# Patient Record
Sex: MALE | Race: WHITE | NOT HISPANIC OR LATINO | URBAN - METROPOLITAN AREA
[De-identification: names, ages, dates, MRNs, and addresses within clinical notes are randomized per-mention and may not be internally consistent; named-entity substitution may affect disease eponyms.]

---

## 2017-02-18 ENCOUNTER — OUTPATIENT (OUTPATIENT)
Dept: OUTPATIENT SERVICES | Facility: HOSPITAL | Age: 67
LOS: 1 days | Discharge: HOME | End: 2017-02-18

## 2017-06-27 DIAGNOSIS — N32.9 BLADDER DISORDER, UNSPECIFIED: ICD-10-CM

## 2017-06-27 DIAGNOSIS — R10.9 UNSPECIFIED ABDOMINAL PAIN: ICD-10-CM

## 2017-11-29 ENCOUNTER — OUTPATIENT (OUTPATIENT)
Dept: OUTPATIENT SERVICES | Facility: HOSPITAL | Age: 67
LOS: 1 days | Discharge: HOME | End: 2017-11-29

## 2017-11-29 DIAGNOSIS — R73.9 HYPERGLYCEMIA, UNSPECIFIED: ICD-10-CM

## 2017-11-29 DIAGNOSIS — E55.9 VITAMIN D DEFICIENCY, UNSPECIFIED: ICD-10-CM

## 2017-11-29 DIAGNOSIS — E31.9 POLYGLANDULAR DYSFUNCTION, UNSPECIFIED: ICD-10-CM

## 2017-12-05 DIAGNOSIS — R82.90 UNSPECIFIED ABNORMAL FINDINGS IN URINE: ICD-10-CM

## 2017-12-05 DIAGNOSIS — E07.9 DISORDER OF THYROID, UNSPECIFIED: ICD-10-CM

## 2017-12-05 DIAGNOSIS — K75.2 NONSPECIFIC REACTIVE HEPATITIS: ICD-10-CM

## 2017-12-05 DIAGNOSIS — D50.9 IRON DEFICIENCY ANEMIA, UNSPECIFIED: ICD-10-CM

## 2017-12-05 DIAGNOSIS — E61.1 IRON DEFICIENCY: ICD-10-CM

## 2017-12-05 DIAGNOSIS — N42.9 DISORDER OF PROSTATE, UNSPECIFIED: ICD-10-CM

## 2017-12-05 DIAGNOSIS — Z79.899 OTHER LONG TERM (CURRENT) DRUG THERAPY: ICD-10-CM

## 2018-09-14 ENCOUNTER — OUTPATIENT (OUTPATIENT)
Dept: OUTPATIENT SERVICES | Facility: HOSPITAL | Age: 68
LOS: 1 days | Discharge: HOME | End: 2018-09-14

## 2018-09-14 DIAGNOSIS — Z00.01 ENCOUNTER FOR GENERAL ADULT MEDICAL EXAMINATION WITH ABNORMAL FINDINGS: ICD-10-CM

## 2020-02-14 ENCOUNTER — OUTPATIENT (OUTPATIENT)
Dept: OUTPATIENT SERVICES | Facility: HOSPITAL | Age: 70
LOS: 1 days | Discharge: HOME | End: 2020-02-14
Payer: MEDICARE

## 2020-02-14 DIAGNOSIS — M54.5 LOW BACK PAIN: ICD-10-CM

## 2020-02-14 DIAGNOSIS — M25.552 PAIN IN LEFT HIP: ICD-10-CM

## 2020-02-14 DIAGNOSIS — R10.9 UNSPECIFIED ABDOMINAL PAIN: ICD-10-CM

## 2020-02-14 DIAGNOSIS — R05 COUGH: ICD-10-CM

## 2020-02-14 DIAGNOSIS — M25.551 PAIN IN RIGHT HIP: ICD-10-CM

## 2020-02-14 PROCEDURE — 72110 X-RAY EXAM L-2 SPINE 4/>VWS: CPT | Mod: 26

## 2020-02-14 PROCEDURE — 71046 X-RAY EXAM CHEST 2 VIEWS: CPT | Mod: 26

## 2020-02-14 PROCEDURE — 76775 US EXAM ABDO BACK WALL LIM: CPT | Mod: 26

## 2020-02-14 PROCEDURE — 73522 X-RAY EXAM HIPS BI 3-4 VIEWS: CPT | Mod: 26

## 2020-02-26 ENCOUNTER — APPOINTMENT (OUTPATIENT)
Dept: NEUROSURGERY | Facility: CLINIC | Age: 70
End: 2020-02-26
Payer: MEDICARE

## 2020-02-26 VITALS — HEIGHT: 71 IN | WEIGHT: 225 LBS | BODY MASS INDEX: 31.5 KG/M2

## 2020-02-26 DIAGNOSIS — Z82.5 FAMILY HISTORY OF ASTHMA AND OTHER CHRONIC LOWER RESPIRATORY DISEASES: ICD-10-CM

## 2020-02-26 DIAGNOSIS — Z87.01 PERSONAL HISTORY OF PNEUMONIA (RECURRENT): ICD-10-CM

## 2020-02-26 DIAGNOSIS — Z72.89 OTHER PROBLEMS RELATED TO LIFESTYLE: ICD-10-CM

## 2020-02-26 DIAGNOSIS — F17.210 NICOTINE DEPENDENCE, CIGARETTES, UNCOMPLICATED: ICD-10-CM

## 2020-02-26 PROBLEM — Z00.00 ENCOUNTER FOR PREVENTIVE HEALTH EXAMINATION: Status: ACTIVE | Noted: 2020-02-26

## 2020-02-26 PROCEDURE — 99203 OFFICE O/P NEW LOW 30 MIN: CPT

## 2020-02-26 RX ORDER — ASPIRIN 81 MG/1
81 TABLET, CHEWABLE ORAL
Refills: 0 | Status: ACTIVE | COMMUNITY

## 2020-02-26 NOTE — HISTORY OF PRESENT ILLNESS
[> 3 months] : more  than 3 months [FreeTextEntry1] : low back pain radiating to both legs, posteriorly and neck pain radiating to right arm [de-identified] : This is a 69yrs old male who presents today for a consultation of mid low back pain radiating to both legs posteriorly, down to the knee, worse on the right side for the past year. It is associated with numbness, tingling, and weakness. He reports he has been losing his balance for a year. Denies bowel and/or urinary incontinence. Patient has not tried physical therapy nor has he been recently evaluated by pain management. He had an INDRA four or five years ago which he reports did not alleviate his symptoms. Symptoms are aggravated by prolong driving and sitting, and it is alleviated by resting. \par Mr. Kwok also reports of neck pain radiating to the right arm to the hand for approximately 2 to 3 years. It is associated with paresthesia. Denies weakness. He has no recent MRI imaging.

## 2020-02-26 NOTE — REASON FOR VISIT
[New Patient Visit] : a new patient visit [Referred By: _________] : Patient was referred by SEN [Other: _____] : [unfilled]

## 2020-03-17 ENCOUNTER — RESULT REVIEW (OUTPATIENT)
Age: 70
End: 2020-03-17

## 2020-03-17 ENCOUNTER — OUTPATIENT (OUTPATIENT)
Dept: OUTPATIENT SERVICES | Facility: HOSPITAL | Age: 70
LOS: 1 days | Discharge: HOME | End: 2020-03-17
Payer: MEDICARE

## 2020-03-17 DIAGNOSIS — M54.12 RADICULOPATHY, CERVICAL REGION: ICD-10-CM

## 2020-03-17 PROCEDURE — 72148 MRI LUMBAR SPINE W/O DYE: CPT | Mod: 26

## 2020-03-17 PROCEDURE — 72141 MRI NECK SPINE W/O DYE: CPT | Mod: 26

## 2020-03-18 ENCOUNTER — OUTPATIENT (OUTPATIENT)
Dept: OUTPATIENT SERVICES | Facility: HOSPITAL | Age: 70
LOS: 1 days | Discharge: HOME | End: 2020-03-18
Payer: MEDICARE

## 2020-03-18 DIAGNOSIS — E80.7 DISORDER OF BILIRUBIN METABOLISM, UNSPECIFIED: ICD-10-CM

## 2020-03-18 PROCEDURE — 76700 US EXAM ABDOM COMPLETE: CPT | Mod: 26

## 2020-07-08 ENCOUNTER — APPOINTMENT (OUTPATIENT)
Dept: NEUROSURGERY | Facility: CLINIC | Age: 70
End: 2020-07-08

## 2020-07-20 ENCOUNTER — APPOINTMENT (OUTPATIENT)
Dept: NEUROSURGERY | Facility: CLINIC | Age: 70
End: 2020-07-20
Payer: MEDICARE

## 2020-07-20 VITALS — WEIGHT: 225 LBS | BODY MASS INDEX: 31.5 KG/M2 | HEIGHT: 71 IN

## 2020-07-20 PROCEDURE — 99214 OFFICE O/P EST MOD 30 MIN: CPT

## 2020-07-21 NOTE — HISTORY OF PRESENT ILLNESS
[FreeTextEntry1] : Mr. Kwok presents with new MRIs of the cervical / lumbar spine. These studies were reviewed today. \par \par - MRI cervical spine: diffuse cervical spondylosis, foraminal narrowing most impressive at C6/7. No cord compression. \par \par - MRI lumbar spine: listhesis at L4/5 with left greater then right foraminal stenosis. There is right L4/5 facet arthropathy. Right L3/4 foraminal HNP. \par \par A month ago he had a LESI x 1 with Dr. TORRES, pain management. This has provided him with significant pain relief, however, he continues to have some right groin pain with hip flexion, however, this is only when he is standing (such as putting on a pair of pants). He has very mild neck pain today. He is taking Celebrex and a Muscle Relaxant which has helped ease his pain. He continues to have issues with his gait.

## 2020-07-21 NOTE — ASSESSMENT
[FreeTextEntry1] : We had a thorough discussion regarding his condition and MRI findings. I have recommended PT to include traction/ inversion table. I would also like him to consult with one of our general neurologists, Dr. Pastrana or Dr. Chanel to rule out Parkinson's disease.

## 2020-07-21 NOTE — PHYSICAL EXAM
[FreeTextEntry1] : General - well appearing in no acute distress. Affect: flat. \par \par Neuro - alert / oriented. CN II-XII intact. ; 5/5 strength; sensation intact; reflexes normal; no Hoffmans or clonus, gait  - slow, shuffling. + cogwheeling. \par \par Musculoskeletal - decreased ROM; no tenderness to palpation; negative straight leg raise; negative FABERs; negative Tinels. Pain / restriction with ROM of the right hip. \par

## 2020-08-10 ENCOUNTER — APPOINTMENT (OUTPATIENT)
Dept: NEUROLOGY | Facility: CLINIC | Age: 70
End: 2020-08-10

## 2020-08-31 ENCOUNTER — APPOINTMENT (OUTPATIENT)
Dept: NEUROLOGY | Facility: CLINIC | Age: 70
End: 2020-08-31
Payer: MEDICARE

## 2020-08-31 VITALS
DIASTOLIC BLOOD PRESSURE: 86 MMHG | WEIGHT: 222 LBS | HEIGHT: 71 IN | SYSTOLIC BLOOD PRESSURE: 142 MMHG | HEART RATE: 82 BPM | OXYGEN SATURATION: 98 % | TEMPERATURE: 98.2 F | BODY MASS INDEX: 31.08 KG/M2

## 2020-08-31 DIAGNOSIS — Z86.39 PERSONAL HISTORY OF OTHER ENDOCRINE, NUTRITIONAL AND METABOLIC DISEASE: ICD-10-CM

## 2020-08-31 DIAGNOSIS — Z87.39 PERSONAL HISTORY OF OTHER DISEASES OF THE MUSCULOSKELETAL SYSTEM AND CONNECTIVE TISSUE: ICD-10-CM

## 2020-08-31 DIAGNOSIS — Z87.09 PERSONAL HISTORY OF OTHER DISEASES OF THE RESPIRATORY SYSTEM: ICD-10-CM

## 2020-08-31 DIAGNOSIS — Z87.19 PERSONAL HISTORY OF OTHER DISEASES OF THE DIGESTIVE SYSTEM: ICD-10-CM

## 2020-08-31 PROCEDURE — 99204 OFFICE O/P NEW MOD 45 MIN: CPT

## 2020-08-31 PROCEDURE — 99214 OFFICE O/P EST MOD 30 MIN: CPT

## 2020-08-31 NOTE — PHYSICAL EXAM
[FreeTextEntry1] : Physical examination:  \par General:   The patient is pleasant, cooperative, well dressed and in no acute distress.  Appearance is consistent with chronologic age.  No abnormal facies.\par Neurologic examination:  The patient is oriented to person, place, time and date.   Remote and recent memory is normal.   Fund of knowledge is intact and normal.  Language with normal repetition, comprehension and naming.  Nondysarthric.   \par Cranial nerves examination: intact VA, VFF.  EOMI w/o nystagmus, skew or reported double vision.  normal vertical gaze and pursuit.  PERRL.  No ptosis/weakness of eyelid closure.  Facial sensation is normal with normal bite.  No facial asymmetry.  Hearing grossly intact b/l.  Palate elevates midline.  Neck flexion/extension, SCM/Trap strength normal.  Tongue midline with full resistance.  \par Motor examination:  Slight increase in tone, normal bulk and stiffness w/ limited range of motion.  No tenderness, twitching, tremors or involuntary movements.      \par Formal Muscle Strength Testing: (MRC grade R/L) 5/5 FRANCISCA, BB, TR, WF, WE, FF, FE; 5/5 ILP, QDS, HS, DF, PF.  Arises from sitting/squatting wnl.  Toe/heel walks.  \par Reflexes:   2+ b/l pectoralis, biceps, triceps, brachioradialis, patella and Achilles.  Plantar response downgoing b/l.  Jaw jerk, Frandy, clonus absent.  \par Sensory examination:   Intact to light touch and pinprick, pain, temperature and proprioception and vibration in all extremities.    \par Cerebellum:   FTN/HKS intact with normal MAREK in all limbs.   \par Gait is narrow based hesitant and antalgic but no festination.  No en bloc turns.  (-) retropulsion.  Romberg (-).  No dysmetria or dysdiadokinesia.       \par \par

## 2020-08-31 NOTE — ASSESSMENT
[FreeTextEntry1] : This is a 68 yo RHM w/ h/o emphysema, abdominal hernia, chronic lumbago currently p/w worsening gait here for evaluation of possible parkinsonism.  There is currently grossly no evidence of parkinsonism on exam.  Suspect gait likely combination of pain, deconditioning and arthropathy.  In addition, suspect RSBD and poor sleep hygiene as well.  Anosmia explained by chronic cigarette use.  Transient hand numbness may be secondary to mild CTS.  Pt currently does not want further evaluation and recommend observation for now.  Advised active participation with PT for ROM and core-strengthening (consider abdominal binder to limit worsening of hernia).  \par \par Recommendations:\par - continue PT for ROM and core-strenthening\par - proper sleep hygiene\par - continue observation for gait ataxia, sleep and hand paresthesias\par - f/u with pain management and neurosurgery for LBP if persistent\par - RTC 6 months

## 2020-08-31 NOTE — HISTORY OF PRESENT ILLNESS
[FreeTextEntry1] : This is a 70 yo RHM referred for ataxia.  Pt has been having worsening gait over the last year according to spouse and has been "shuffling" more with gait instability.  Denies any falls and typically able to catch himself but has been walking slower.  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  Has extensive h/o back pain and stiffness and most recently had Neurosurgery evaluation with Dr. Sanchez found w/ evidence of disc herniations.  Pt wants to avoid surgery and is currently s/p INDRA L side which has helped pain drastically but continues to have some problems with ambulation.  Pt particularly notices problems pulling his pant legs up or bending down.  Has R knee pain in addition to R hip pain.  Recently started PT and continues to take NSAIDs and Tizanidine.  Denies any focal weakness. No noticeable tremor.  \par \par Pt does endorse anosmia that has been ongoing for several years that he attributed to long history of cigarette use.  Spouse has noted pt acting out his dreams at one point trying to strangle her in his sleep.  Denies any parasomnias in the past or in childhood.  No family history of movement disorders.  No personality change or behavioral issues according to spouse.  Pt notes having difficulty sleeping after getting  1 year ago.  Has been falling asleep at 7PM and waking up at 2AM.  Feels tired throughout the day.  \par \par Pt also notices occasional transient b/l hand/finger numbness and paresthesias affecting the distal fingers 1 - 4 during long car rides after gripping steering wheel.  Denies any  weakness or persistent sensory problems.  Does not affect him on nightly basis, only with long car rides.

## 2020-10-07 ENCOUNTER — RESULT REVIEW (OUTPATIENT)
Age: 70
End: 2020-10-07

## 2020-11-11 ENCOUNTER — APPOINTMENT (OUTPATIENT)
Dept: NEUROSURGERY | Facility: CLINIC | Age: 70
End: 2020-11-11
Payer: MEDICARE

## 2020-11-11 ENCOUNTER — OUTPATIENT (OUTPATIENT)
Dept: OUTPATIENT SERVICES | Facility: HOSPITAL | Age: 70
LOS: 1 days | Discharge: HOME | End: 2020-11-11
Payer: MEDICARE

## 2020-11-11 VITALS — BODY MASS INDEX: 31.5 KG/M2 | HEIGHT: 71 IN | WEIGHT: 225 LBS

## 2020-11-11 DIAGNOSIS — M47.816 SPONDYLOSIS WITHOUT MYELOPATHY OR RADICULOPATHY, LUMBAR REGION: ICD-10-CM

## 2020-11-11 DIAGNOSIS — M48.061 SPINAL STENOSIS, LUMBAR REGION WITHOUT NEUROGENIC CLAUDICATION: ICD-10-CM

## 2020-11-11 PROCEDURE — 99072 ADDL SUPL MATRL&STAF TM PHE: CPT

## 2020-11-11 PROCEDURE — 99214 OFFICE O/P EST MOD 30 MIN: CPT

## 2020-11-11 PROCEDURE — 72110 X-RAY EXAM L-2 SPINE 4/>VWS: CPT | Mod: 26

## 2020-11-12 NOTE — HISTORY OF PRESENT ILLNESS
[FreeTextEntry1] : CC:  low back pain radiating to the legs, neurogenic claudication\par \par HPI:  I have been following this patient for several months.  Briefly, in the past, he had low back pain radiating to the legs in a radicular distribution.  It is made worse with movement.  He had PT and an epidural which relieved the pain.  However, he reports persistent weakness in his legs and numbness/heaviness.  His legs give out when he stands too long or ambulates for too long.  He is unable to bend forward without pain.\par \par MRI cervical spine unremarkable\par MRI lumbar spine 5/2020 shows severe right L3-4 foraminal stenosis/disc, L4-5 spondy with moderate bilateral stenosis\par xray lumbar spine shows mobile spondy at L4-5

## 2020-11-12 NOTE — PHYSICAL EXAM
[FreeTextEntry1] : Physical exam: \par \par Vital Signs - stable\par \par General - well appearing in no acute distress\par \par HEENT-normocephalic atraumatic\par \par Skin-intact\par \par CV-no edema, good distal pulses, radial and DP\par \par Lungs-no wheezing or cyanosis\par \par Musculoskeletal- significant decreased ROM; no tenderness to palpation; negative Spurling’s; negative Lhermittes; negative straight leg raise; negative FABERs; negative Tinels\par \par Neuro- awake, alert, and oriented; CN II-XII intact; 5/5 strength with the exception of bilateral quadriceps; sensation intact; reflexes normal; no Hoffmans or clonus, gait antalgic\par \par \par

## 2020-11-12 NOTE — ASSESSMENT
[FreeTextEntry1] : I recommend a repeat MRI lumbar spine and thoracic spine for further evaluation.  He will return once this is done.\par We discussed the possibility of a right L3-4 laminoforaminotomy, foraminal discectomy and L4-5 laminectomy with interlaminar stabilization due to the instability if the MRI is unchanged.

## 2020-12-07 ENCOUNTER — OUTPATIENT (OUTPATIENT)
Dept: OUTPATIENT SERVICES | Facility: HOSPITAL | Age: 70
LOS: 1 days | Discharge: HOME | End: 2020-12-07
Payer: MEDICARE

## 2020-12-07 DIAGNOSIS — M48.061 SPINAL STENOSIS, LUMBAR REGION WITHOUT NEUROGENIC CLAUDICATION: ICD-10-CM

## 2020-12-07 DIAGNOSIS — M47.816 SPONDYLOSIS WITHOUT MYELOPATHY OR RADICULOPATHY, LUMBAR REGION: ICD-10-CM

## 2020-12-07 PROCEDURE — 72146 MRI CHEST SPINE W/O DYE: CPT | Mod: 26

## 2020-12-07 PROCEDURE — 72148 MRI LUMBAR SPINE W/O DYE: CPT | Mod: 26

## 2020-12-09 ENCOUNTER — APPOINTMENT (OUTPATIENT)
Dept: NEUROSURGERY | Facility: CLINIC | Age: 70
End: 2020-12-09
Payer: MEDICARE

## 2020-12-09 PROCEDURE — 99214 OFFICE O/P EST MOD 30 MIN: CPT

## 2020-12-09 PROCEDURE — 99072 ADDL SUPL MATRL&STAF TM PHE: CPT

## 2020-12-10 NOTE — HISTORY OF PRESENT ILLNESS
[FreeTextEntry1] : Mr. Kwok, who has chronic LBP radiating to bilateral legs in a radicular pattern, presents today to review lumbar/thoracic MRI, today he reports of his "gait being off" and mild LBP. He had LESI a few months ago with Dr. Price which did alleviate his severe back/leg pain. Denies paresthesia. \par \par MRI of the lumbar spine w/o contrast showed at L3-4 severe right foraminal stenosis/disc herniation and L4-5 spondylosis with moderate bilateral stenosis

## 2021-03-03 ENCOUNTER — APPOINTMENT (OUTPATIENT)
Dept: NEUROLOGY | Facility: CLINIC | Age: 71
End: 2021-03-03
Payer: MEDICARE

## 2021-03-03 VITALS
SYSTOLIC BLOOD PRESSURE: 137 MMHG | WEIGHT: 225 LBS | OXYGEN SATURATION: 96 % | DIASTOLIC BLOOD PRESSURE: 87 MMHG | HEIGHT: 70 IN | TEMPERATURE: 97.9 F | HEART RATE: 91 BPM | BODY MASS INDEX: 32.21 KG/M2

## 2021-03-03 DIAGNOSIS — R27.0 ATAXIA, UNSPECIFIED: ICD-10-CM

## 2021-03-03 PROCEDURE — 99213 OFFICE O/P EST LOW 20 MIN: CPT

## 2021-03-03 PROCEDURE — 99072 ADDL SUPL MATRL&STAF TM PHE: CPT

## 2021-03-03 NOTE — ASSESSMENT
[FreeTextEntry1] : This is a 68 yo RHM w/ h/o emphysema, abdominal hernia, chronic lumbago currently p/w worsening gait suspect neurogenic claudication from stenosis causing sensation of weakness/stiffness with prolonged standing/ambulation since has worsening disc disease on recent neuroimaging.  Pt reluctant for surgery.  Recommend EMG/NCS for assessment of radiculopathy but pt also reluctant for testing.  No signs of neurodegenerative disease causing ataxia.  \par \par Recommendations:\par - EMG/NCS b/l LE\par - consider decompression as recommended by neurosurgery if symptoms continue to progress\par - RTC in 2 - 4 wks for electrodiagnostic testing and f/u

## 2021-03-03 NOTE — HISTORY OF PRESENT ILLNESS
[FreeTextEntry1] : Since his last visit, Mr. Kwok was found to have worsening disc herniation R L3 - 4 and 4 - 5.  Was recommended to have decompression with Dr. Sanchez but pt refused.  Has appointment with new pain management but pt reports not having any pain.  Does continues to have ataxia described as weakness and stiffness typically after standing > 5 minutes followed by need to rest.  Denies any pain during these episodes but has "jelly" - like sensation in the legs.  denies any incontinence or sensory level.  No saddle anesthesia.

## 2021-03-03 NOTE — PHYSICAL EXAM
[FreeTextEntry1] : NAD.  AOx3.  Intact memory.  Speech fluent, nondysarthric.  CN 2 – 12 normal.\par Strength 5/5 b/l UE; 4+/5- LE.  NL tone, bulk.  No abnl movements.  No tremors or cogwheeling.  DTRs 2+ UE, 1+ LE.  Plantar response downgoing b/l.  (-) Hoffmans, clonus.  Sensory intact LT/PP, pain, temp, proprioception and vibration.  NL FTN/HKS.  No dysdiadokinesia.  Gait antalgic with normal toe/heel walk, normal squatting.  Has stiffness getting up and down and arising from seated position with LROM hips.  \par

## 2021-03-18 ENCOUNTER — OUTPATIENT (OUTPATIENT)
Dept: OUTPATIENT SERVICES | Facility: HOSPITAL | Age: 71
LOS: 1 days | Discharge: HOME | End: 2021-03-18
Payer: MEDICARE

## 2021-03-18 DIAGNOSIS — M25.559 PAIN IN UNSPECIFIED HIP: ICD-10-CM

## 2021-03-18 PROCEDURE — 73522 X-RAY EXAM HIPS BI 3-4 VIEWS: CPT | Mod: 26

## 2021-03-29 ENCOUNTER — OUTPATIENT (OUTPATIENT)
Dept: OUTPATIENT SERVICES | Facility: HOSPITAL | Age: 71
LOS: 1 days | Discharge: HOME | End: 2021-03-29
Payer: MEDICARE

## 2021-03-29 DIAGNOSIS — R51.0 HEADACHE WITH ORTHOSTATIC COMPONENT, NOT ELSEWHERE CLASSIFIED: ICD-10-CM

## 2021-03-29 DIAGNOSIS — M54.12 RADICULOPATHY, CERVICAL REGION: ICD-10-CM

## 2021-03-29 PROCEDURE — 72141 MRI NECK SPINE W/O DYE: CPT | Mod: 26

## 2021-03-29 PROCEDURE — 70551 MRI BRAIN STEM W/O DYE: CPT | Mod: 26

## 2021-06-16 ENCOUNTER — OUTPATIENT (OUTPATIENT)
Dept: OUTPATIENT SERVICES | Facility: HOSPITAL | Age: 71
LOS: 1 days | Discharge: HOME | End: 2021-06-16
Payer: MEDICARE

## 2021-06-16 DIAGNOSIS — M19.90 UNSPECIFIED OSTEOARTHRITIS, UNSPECIFIED SITE: ICD-10-CM

## 2021-06-16 DIAGNOSIS — M25.551 PAIN IN RIGHT HIP: ICD-10-CM

## 2021-06-16 PROCEDURE — 73721 MRI JNT OF LWR EXTRE W/O DYE: CPT | Mod: 26,RT,MH

## 2021-06-24 ENCOUNTER — OUTPATIENT (OUTPATIENT)
Dept: OUTPATIENT SERVICES | Facility: HOSPITAL | Age: 71
LOS: 1 days | Discharge: HOME | End: 2021-06-24
Payer: MEDICARE

## 2021-06-24 DIAGNOSIS — M25.569 PAIN IN UNSPECIFIED KNEE: ICD-10-CM

## 2021-06-24 PROCEDURE — 73562 X-RAY EXAM OF KNEE 3: CPT | Mod: 26,50

## 2021-06-28 ENCOUNTER — APPOINTMENT (OUTPATIENT)
Dept: NEUROSURGERY | Facility: CLINIC | Age: 71
End: 2021-06-28
Payer: MEDICARE

## 2021-06-28 PROCEDURE — 99213 OFFICE O/P EST LOW 20 MIN: CPT

## 2021-06-28 NOTE — REASON FOR VISIT
[FreeTextEntry1] : Patient returns and has had a decline in his ability to ambulate.\par \par [place] [facility] [place] negative.\par He has no pain.\par Claudication\par \par Discussed surgical procedure - L4-5 laminectomy with interlaminar stabilization, right L3-4 laminoforaminotomy\par \par He is now considering it\par \par I need an updated MRI lumbar spine since there is a deterioration in ambulation and he will return once this is done to discuss surgery.

## 2021-07-06 ENCOUNTER — APPOINTMENT (OUTPATIENT)
Dept: NEUROLOGY | Facility: CLINIC | Age: 71
End: 2021-07-06

## 2021-07-07 ENCOUNTER — OUTPATIENT (OUTPATIENT)
Dept: OUTPATIENT SERVICES | Facility: HOSPITAL | Age: 71
LOS: 1 days | Discharge: HOME | End: 2021-07-07
Payer: MEDICARE

## 2021-07-07 ENCOUNTER — RESULT REVIEW (OUTPATIENT)
Age: 71
End: 2021-07-07

## 2021-07-07 DIAGNOSIS — M48.061 SPINAL STENOSIS, LUMBAR REGION WITHOUT NEUROGENIC CLAUDICATION: ICD-10-CM

## 2021-07-07 PROCEDURE — 72148 MRI LUMBAR SPINE W/O DYE: CPT | Mod: 26

## 2021-07-12 ENCOUNTER — APPOINTMENT (OUTPATIENT)
Dept: NEUROSURGERY | Facility: CLINIC | Age: 71
End: 2021-07-12
Payer: MEDICARE

## 2021-07-12 VITALS — HEIGHT: 70 IN | WEIGHT: 222 LBS | BODY MASS INDEX: 31.78 KG/M2

## 2021-07-12 PROCEDURE — 99211 OFF/OP EST MAY X REQ PHY/QHP: CPT

## 2021-07-13 NOTE — HISTORY OF PRESENT ILLNESS
[FreeTextEntry1] : Patient continues to have difficulty ambulating.\par MRI shows slight progression of the L3-4 disc.\par \par Recommend right L3-4 foraminal discectomy, L4-5 interlaminar stabilization.\par \par He will be cleared by PCP and cardiologist as well.\par \par

## 2021-07-28 ENCOUNTER — APPOINTMENT (OUTPATIENT)
Dept: NEUROSURGERY | Facility: CLINIC | Age: 71
End: 2021-07-28

## 2021-07-29 ENCOUNTER — APPOINTMENT (OUTPATIENT)
Dept: CARDIOLOGY | Facility: CLINIC | Age: 71
End: 2021-07-29
Payer: MEDICARE

## 2021-07-29 ENCOUNTER — OUTPATIENT (OUTPATIENT)
Dept: OUTPATIENT SERVICES | Facility: HOSPITAL | Age: 71
LOS: 1 days | Discharge: HOME | End: 2021-07-29
Payer: MEDICARE

## 2021-07-29 VITALS
TEMPERATURE: 97 F | SYSTOLIC BLOOD PRESSURE: 130 MMHG | HEIGHT: 71 IN | HEART RATE: 87 BPM | WEIGHT: 222 LBS | BODY MASS INDEX: 31.08 KG/M2 | DIASTOLIC BLOOD PRESSURE: 80 MMHG

## 2021-07-29 DIAGNOSIS — M47.812 SPONDYLOSIS W/OUT MYELOPATHY OR RADICULOPATHY, CERVICAL REGION: ICD-10-CM

## 2021-07-29 DIAGNOSIS — M25.569 PAIN IN UNSPECIFIED KNEE: ICD-10-CM

## 2021-07-29 PROCEDURE — 73721 MRI JNT OF LWR EXTRE W/O DYE: CPT | Mod: 26,LT,MH

## 2021-07-29 PROCEDURE — 93000 ELECTROCARDIOGRAM COMPLETE: CPT

## 2021-07-29 PROCEDURE — 99204 OFFICE O/P NEW MOD 45 MIN: CPT

## 2021-07-29 NOTE — HISTORY OF PRESENT ILLNESS
[FreeTextEntry1] : pre-op for neor surgery of lower lumbar\par needs pre-op clearance\par smoker for years\par c/o chest pain - possibly ischemic\par sob probably copd in nature\par no arrythmias\par no tia, cva but has pvd\par has 1-2 plus edema - venous insufficiency\par \par no valvular heart disease\par no chf

## 2021-09-08 ENCOUNTER — RESULT REVIEW (OUTPATIENT)
Age: 71
End: 2021-09-08

## 2021-09-08 ENCOUNTER — OUTPATIENT (OUTPATIENT)
Dept: OUTPATIENT SERVICES | Facility: HOSPITAL | Age: 71
LOS: 1 days | Discharge: HOME | End: 2021-09-08
Payer: MEDICARE

## 2021-09-08 DIAGNOSIS — R07.9 CHEST PAIN, UNSPECIFIED: ICD-10-CM

## 2021-09-08 PROCEDURE — 78452 HT MUSCLE IMAGE SPECT MULT: CPT | Mod: 26

## 2021-09-15 ENCOUNTER — APPOINTMENT (OUTPATIENT)
Dept: CARDIOLOGY | Facility: CLINIC | Age: 71
End: 2021-09-15
Payer: MEDICARE

## 2021-09-15 PROCEDURE — 93306 TTE W/DOPPLER COMPLETE: CPT

## 2021-09-21 ENCOUNTER — APPOINTMENT (OUTPATIENT)
Dept: CARDIOLOGY | Facility: CLINIC | Age: 71
End: 2021-09-21
Payer: MEDICARE

## 2021-09-21 VITALS
DIASTOLIC BLOOD PRESSURE: 80 MMHG | BODY MASS INDEX: 30.52 KG/M2 | HEART RATE: 72 BPM | TEMPERATURE: 97.5 F | WEIGHT: 218 LBS | HEIGHT: 71 IN | SYSTOLIC BLOOD PRESSURE: 130 MMHG

## 2021-09-21 DIAGNOSIS — R60.9 EDEMA, UNSPECIFIED: ICD-10-CM

## 2021-09-21 DIAGNOSIS — R07.9 CHEST PAIN, UNSPECIFIED: ICD-10-CM

## 2021-09-21 PROCEDURE — 99214 OFFICE O/P EST MOD 30 MIN: CPT

## 2021-09-21 PROCEDURE — 93000 ELECTROCARDIOGRAM COMPLETE: CPT

## 2021-09-21 RX ORDER — FUROSEMIDE 40 MG/1
40 TABLET ORAL DAILY
Qty: 60 | Refills: 2 | Status: ACTIVE | COMMUNITY
Start: 2021-09-21 | End: 1900-01-01

## 2021-09-21 NOTE — HISTORY OF PRESENT ILLNESS
[FreeTextEntry1] : pre-op for neor surgery of lower lumbar\par needs pre-op clearance\par smoker for years\par c/o chest pain - possibly ischemic\par sob probably copd in nature\par no arrythmias\par no tia, cva but has pvd\par has 1-2 plus edema - venous insufficiency\par \par no valvular heart disease\par no chf\par \par echo 8/2021 was normal and no significant valve disease\par nuclear 9/2021 was normal\par \par cleared for surgery

## 2021-09-21 NOTE — DISCUSSION/SUMMARY
[FreeTextEntry1] : 40 mg lasix prn for edema\par cleared for surgery\par no further cardiac work up needed\par rv in 3 mos.

## 2021-09-27 ENCOUNTER — APPOINTMENT (OUTPATIENT)
Dept: NEUROSURGERY | Facility: CLINIC | Age: 71
End: 2021-09-27
Payer: MEDICARE

## 2021-09-27 VITALS — BODY MASS INDEX: 30.24 KG/M2 | WEIGHT: 216 LBS | HEIGHT: 71 IN

## 2021-09-27 PROCEDURE — 99214 OFFICE O/P EST MOD 30 MIN: CPT

## 2021-09-27 NOTE — ASSESSMENT
[FreeTextEntry1] : Mr. Kwok is a candidate for a right L3-4 foraminal discectomy and L4-5 interlaminar stabilization. The risks of the surgery were discussed at length, including but not limited to failure to improve, recurrent herniated disc, paralysis, spinal fluid leak, wound infections, chronic neuropathy/pain, anesthesia complications, postoperative weakness, and the potential need for further surgical intervention. All questions answered today. He has requested to proceed. \par

## 2021-09-27 NOTE — HISTORY OF PRESENT ILLNESS
[FreeTextEntry1] : Mr. Kwok continues to have neurogenic claudication in the legs with difficulty walking x 2+ years. MRI LS spine,7/2021 showed L3/4 severe foraminal stenosis / disc herniation and L4/5 stenosis. Recently he saw Dr. Alcala for cardiac clearance.

## 2021-09-30 ENCOUNTER — APPOINTMENT (OUTPATIENT)
Dept: CARDIOLOGY | Facility: CLINIC | Age: 71
End: 2021-09-30

## 2021-10-08 ENCOUNTER — OUTPATIENT (OUTPATIENT)
Dept: OUTPATIENT SERVICES | Facility: HOSPITAL | Age: 71
LOS: 1 days | Discharge: HOME | End: 2021-10-08
Payer: MEDICARE

## 2021-10-08 VITALS
TEMPERATURE: 97 F | DIASTOLIC BLOOD PRESSURE: 84 MMHG | SYSTOLIC BLOOD PRESSURE: 160 MMHG | HEIGHT: 71 IN | WEIGHT: 219.58 LBS | RESPIRATION RATE: 22 BRPM | OXYGEN SATURATION: 100 % | HEART RATE: 84 BPM

## 2021-10-08 DIAGNOSIS — Z01.818 ENCOUNTER FOR OTHER PREPROCEDURAL EXAMINATION: ICD-10-CM

## 2021-10-08 DIAGNOSIS — Z98.890 OTHER SPECIFIED POSTPROCEDURAL STATES: Chronic | ICD-10-CM

## 2021-10-08 DIAGNOSIS — M48.061 SPINAL STENOSIS, LUMBAR REGION WITHOUT NEUROGENIC CLAUDICATION: ICD-10-CM

## 2021-10-08 LAB
A1C WITH ESTIMATED AVERAGE GLUCOSE RESULT: 6 % — HIGH (ref 4–5.6)
ALBUMIN SERPL ELPH-MCNC: 4.8 G/DL — SIGNIFICANT CHANGE UP (ref 3.5–5.2)
ALP SERPL-CCNC: 96 U/L — SIGNIFICANT CHANGE UP (ref 30–115)
ALT FLD-CCNC: 15 U/L — SIGNIFICANT CHANGE UP (ref 0–41)
ANION GAP SERPL CALC-SCNC: 16 MMOL/L — HIGH (ref 7–14)
APTT BLD: 38.2 SEC — SIGNIFICANT CHANGE UP (ref 27–39.2)
AST SERPL-CCNC: 14 U/L — SIGNIFICANT CHANGE UP (ref 0–41)
BASOPHILS # BLD AUTO: 0.08 K/UL — SIGNIFICANT CHANGE UP (ref 0–0.2)
BASOPHILS NFR BLD AUTO: 0.9 % — SIGNIFICANT CHANGE UP (ref 0–1)
BILIRUB SERPL-MCNC: 1.5 MG/DL — HIGH (ref 0.2–1.2)
BLD GP AB SCN SERPL QL: SIGNIFICANT CHANGE UP
BUN SERPL-MCNC: 16 MG/DL — SIGNIFICANT CHANGE UP (ref 10–20)
CALCIUM SERPL-MCNC: 9.8 MG/DL — SIGNIFICANT CHANGE UP (ref 8.5–10.1)
CHLORIDE SERPL-SCNC: 104 MMOL/L — SIGNIFICANT CHANGE UP (ref 98–110)
CO2 SERPL-SCNC: 23 MMOL/L — SIGNIFICANT CHANGE UP (ref 17–32)
CREAT SERPL-MCNC: 0.9 MG/DL — SIGNIFICANT CHANGE UP (ref 0.7–1.5)
EOSINOPHIL # BLD AUTO: 0.19 K/UL — SIGNIFICANT CHANGE UP (ref 0–0.7)
EOSINOPHIL NFR BLD AUTO: 2 % — SIGNIFICANT CHANGE UP (ref 0–8)
ESTIMATED AVERAGE GLUCOSE: 126 MG/DL — HIGH (ref 68–114)
GLUCOSE SERPL-MCNC: 110 MG/DL — HIGH (ref 70–99)
HCT VFR BLD CALC: 50.7 % — SIGNIFICANT CHANGE UP (ref 42–52)
HGB BLD-MCNC: 17.8 G/DL — SIGNIFICANT CHANGE UP (ref 14–18)
IMM GRANULOCYTES NFR BLD AUTO: 0.5 % — HIGH (ref 0.1–0.3)
INR BLD: 0.99 RATIO — SIGNIFICANT CHANGE UP (ref 0.65–1.3)
LYMPHOCYTES # BLD AUTO: 2.27 K/UL — SIGNIFICANT CHANGE UP (ref 1.2–3.4)
LYMPHOCYTES # BLD AUTO: 24.3 % — SIGNIFICANT CHANGE UP (ref 20.5–51.1)
MCHC RBC-ENTMCNC: 29.9 PG — SIGNIFICANT CHANGE UP (ref 27–31)
MCHC RBC-ENTMCNC: 35.1 G/DL — SIGNIFICANT CHANGE UP (ref 32–37)
MCV RBC AUTO: 85.1 FL — SIGNIFICANT CHANGE UP (ref 80–94)
MONOCYTES # BLD AUTO: 0.7 K/UL — HIGH (ref 0.1–0.6)
MONOCYTES NFR BLD AUTO: 7.5 % — SIGNIFICANT CHANGE UP (ref 1.7–9.3)
NEUTROPHILS # BLD AUTO: 6.07 K/UL — SIGNIFICANT CHANGE UP (ref 1.4–6.5)
NEUTROPHILS NFR BLD AUTO: 64.8 % — SIGNIFICANT CHANGE UP (ref 42.2–75.2)
NRBC # BLD: 0 /100 WBCS — SIGNIFICANT CHANGE UP (ref 0–0)
PLATELET # BLD AUTO: 190 K/UL — SIGNIFICANT CHANGE UP (ref 130–400)
POTASSIUM SERPL-MCNC: 4 MMOL/L — SIGNIFICANT CHANGE UP (ref 3.5–5)
POTASSIUM SERPL-SCNC: 4 MMOL/L — SIGNIFICANT CHANGE UP (ref 3.5–5)
PROT SERPL-MCNC: 7.1 G/DL — SIGNIFICANT CHANGE UP (ref 6–8)
PROTHROM AB SERPL-ACNC: 11.4 SEC — SIGNIFICANT CHANGE UP (ref 9.95–12.87)
RBC # BLD: 5.96 M/UL — SIGNIFICANT CHANGE UP (ref 4.7–6.1)
RBC # FLD: 12 % — SIGNIFICANT CHANGE UP (ref 11.5–14.5)
SODIUM SERPL-SCNC: 143 MMOL/L — SIGNIFICANT CHANGE UP (ref 135–146)
WBC # BLD: 9.36 K/UL — SIGNIFICANT CHANGE UP (ref 4.8–10.8)
WBC # FLD AUTO: 9.36 K/UL — SIGNIFICANT CHANGE UP (ref 4.8–10.8)

## 2021-10-08 NOTE — H&P PST ADULT - HISTORY OF PRESENT ILLNESS
Patient is a 70 year old male presenting to PAST in preparation for LUMBAR THREE-FOUR FORAMINAL DISCECTOMY LUMBAR FOUR FIVE INTERLAMINAR STABILIZATION on 10/22 under gen anesthesia by Dr. smith  Pt reports h/o back pain, has h/o disc herniations and buldging discs.He states he has had epidural injections with mild relief.Has been advised to have above  reports no c/o cp,sob,palpitations,cough or dysuria  1-2 fos without sob    Patient denies any signs or symptoms of COVID 19 and denies contact with known positive individuals.  They have an appointment for repeat COVID testing pre-procedure and acknowledge its time and place.  They were instructed to quarantine pre-procedure, practice exposure control measures, continue to self-monitor and report any concerns to their proceduralist.    Anesthesia Alert  NO--Difficult Airway  NO--History of neck surgery or radiation  NO--Limited ROM of neck  NO--History of Malignant hyperthermia  NO--Personal or family history of Pseudocholinesterase deficiency  NO--Prior Anesthesia Complication  NO--Latex Allergy  yes--Loose teeth( upper and lower dentures)  NO--History of Rheumatoid Arthritis  NO--CORINA  NO-- BLEEDING RISK  NO--Other_____    As per patient, this is their complete medical and surgical history, including medications both prescribed or over the counter.  Patient verbalized understanding of instructions and was given the opportunity to ask questions and have them answered.

## 2021-10-08 NOTE — H&P PST ADULT - AIRWAY
Problem:  Care  Goal: [place] has at least two successful feeding interactions  Outcome: Outcome Not Met, Continue to Monitor   Continue to monitor infant status in regards to abdominal circumference and emesis. Physician stated plan would be to restart feedings today.   normal

## 2021-10-11 ENCOUNTER — RESULT REVIEW (OUTPATIENT)
Age: 71
End: 2021-10-11

## 2021-10-11 PROCEDURE — 71046 X-RAY EXAM CHEST 2 VIEWS: CPT | Mod: 26

## 2021-10-18 PROBLEM — M54.9 DORSALGIA, UNSPECIFIED: Chronic | Status: ACTIVE | Noted: 2021-10-08

## 2021-10-19 ENCOUNTER — OUTPATIENT (OUTPATIENT)
Dept: OUTPATIENT SERVICES | Facility: HOSPITAL | Age: 71
LOS: 1 days | Discharge: HOME | End: 2021-10-19

## 2021-10-19 ENCOUNTER — LABORATORY RESULT (OUTPATIENT)
Age: 71
End: 2021-10-19

## 2021-10-19 DIAGNOSIS — Z98.890 OTHER SPECIFIED POSTPROCEDURAL STATES: Chronic | ICD-10-CM

## 2021-10-19 DIAGNOSIS — Z11.59 ENCOUNTER FOR SCREENING FOR OTHER VIRAL DISEASES: ICD-10-CM

## 2021-10-22 ENCOUNTER — APPOINTMENT (OUTPATIENT)
Dept: NEUROSURGERY | Facility: HOSPITAL | Age: 71
End: 2021-10-22

## 2021-10-22 ENCOUNTER — INPATIENT (INPATIENT)
Facility: HOSPITAL | Age: 71
LOS: 4 days | Discharge: HOME HEALTH | End: 2021-10-27
Attending: NEUROLOGICAL SURGERY | Admitting: NEUROLOGICAL SURGERY
Payer: MEDICARE

## 2021-10-22 VITALS
HEIGHT: 71 IN | DIASTOLIC BLOOD PRESSURE: 72 MMHG | SYSTOLIC BLOOD PRESSURE: 126 MMHG | RESPIRATION RATE: 18 BRPM | HEART RATE: 80 BPM | WEIGHT: 225.09 LBS | TEMPERATURE: 98 F | OXYGEN SATURATION: 96 %

## 2021-10-22 DIAGNOSIS — Z98.890 OTHER SPECIFIED POSTPROCEDURAL STATES: Chronic | ICD-10-CM

## 2021-10-22 PROCEDURE — 63048 LAM FACETEC &FORAMOT EA ADDL: CPT

## 2021-10-22 PROCEDURE — 63048 LAM FACETEC &FORAMOT EA ADDL: CPT | Mod: 80

## 2021-10-22 PROCEDURE — 63047 LAM FACETEC & FORAMOT LUMBAR: CPT | Mod: 80

## 2021-10-22 PROCEDURE — 63047 LAM FACETEC & FORAMOT LUMBAR: CPT

## 2021-10-22 RX ORDER — CEFAZOLIN SODIUM 1 G
1000 VIAL (EA) INJECTION EVERY 8 HOURS
Refills: 0 | Status: DISCONTINUED | OUTPATIENT
Start: 2021-10-22 | End: 2021-10-25

## 2021-10-22 RX ORDER — FUROSEMIDE 40 MG
40 TABLET ORAL DAILY
Refills: 0 | Status: DISCONTINUED | OUTPATIENT
Start: 2021-10-22 | End: 2021-10-27

## 2021-10-22 RX ORDER — METHOCARBAMOL 500 MG/1
750 TABLET, FILM COATED ORAL EVERY 6 HOURS
Refills: 0 | Status: DISCONTINUED | OUTPATIENT
Start: 2021-10-22 | End: 2021-10-27

## 2021-10-22 RX ORDER — HEPARIN SODIUM 5000 [USP'U]/ML
5000 INJECTION INTRAVENOUS; SUBCUTANEOUS EVERY 8 HOURS
Refills: 0 | Status: DISCONTINUED | OUTPATIENT
Start: 2021-10-23 | End: 2021-10-27

## 2021-10-22 RX ORDER — SODIUM CHLORIDE 9 MG/ML
1000 INJECTION, SOLUTION INTRAVENOUS
Refills: 0 | Status: DISCONTINUED | OUTPATIENT
Start: 2021-10-22 | End: 2021-10-22

## 2021-10-22 RX ORDER — OXYCODONE AND ACETAMINOPHEN 5; 325 MG/1; MG/1
2 TABLET ORAL EVERY 6 HOURS
Refills: 0 | Status: DISCONTINUED | OUTPATIENT
Start: 2021-10-22 | End: 2021-10-27

## 2021-10-22 RX ORDER — SENNA PLUS 8.6 MG/1
2 TABLET ORAL AT BEDTIME
Refills: 0 | Status: DISCONTINUED | OUTPATIENT
Start: 2021-10-22 | End: 2021-10-25

## 2021-10-22 RX ORDER — MORPHINE SULFATE 50 MG/1
2 CAPSULE, EXTENDED RELEASE ORAL
Refills: 0 | Status: DISCONTINUED | OUTPATIENT
Start: 2021-10-22 | End: 2021-10-27

## 2021-10-22 RX ORDER — SODIUM CHLORIDE 9 MG/ML
1000 INJECTION INTRAMUSCULAR; INTRAVENOUS; SUBCUTANEOUS
Refills: 0 | Status: DISCONTINUED | OUTPATIENT
Start: 2021-10-22 | End: 2021-10-23

## 2021-10-22 RX ORDER — PANTOPRAZOLE SODIUM 20 MG/1
40 TABLET, DELAYED RELEASE ORAL
Refills: 0 | Status: DISCONTINUED | OUTPATIENT
Start: 2021-10-22 | End: 2021-10-27

## 2021-10-22 RX ORDER — OXYCODONE AND ACETAMINOPHEN 5; 325 MG/1; MG/1
1 TABLET ORAL EVERY 4 HOURS
Refills: 0 | Status: DISCONTINUED | OUTPATIENT
Start: 2021-10-22 | End: 2021-10-27

## 2021-10-22 RX ORDER — HYDROMORPHONE HYDROCHLORIDE 2 MG/ML
0.5 INJECTION INTRAMUSCULAR; INTRAVENOUS; SUBCUTANEOUS
Refills: 0 | Status: DISCONTINUED | OUTPATIENT
Start: 2021-10-22 | End: 2021-10-22

## 2021-10-22 RX ORDER — CHOLECALCIFEROL (VITAMIN D3) 125 MCG
1 CAPSULE ORAL
Qty: 0 | Refills: 0 | DISCHARGE

## 2021-10-22 RX ORDER — ASPIRIN/CALCIUM CARB/MAGNESIUM 324 MG
1 TABLET ORAL
Qty: 0 | Refills: 0 | DISCHARGE

## 2021-10-22 RX ORDER — FUROSEMIDE 40 MG
1 TABLET ORAL
Qty: 0 | Refills: 0 | DISCHARGE

## 2021-10-22 RX ORDER — OXYCODONE AND ACETAMINOPHEN 5; 325 MG/1; MG/1
1 TABLET ORAL ONCE
Refills: 0 | Status: DISCONTINUED | OUTPATIENT
Start: 2021-10-22 | End: 2021-10-22

## 2021-10-22 RX ADMIN — METHOCARBAMOL 750 MILLIGRAM(S): 500 TABLET, FILM COATED ORAL at 17:00

## 2021-10-22 RX ADMIN — Medication 100 MILLIGRAM(S): at 17:00

## 2021-10-22 RX ADMIN — SODIUM CHLORIDE 100 MILLILITER(S): 9 INJECTION, SOLUTION INTRAVENOUS at 13:48

## 2021-10-22 RX ADMIN — Medication 100 MILLIGRAM(S): at 23:04

## 2021-10-22 RX ADMIN — METHOCARBAMOL 750 MILLIGRAM(S): 500 TABLET, FILM COATED ORAL at 23:04

## 2021-10-22 NOTE — PHYSICAL THERAPY INITIAL EVALUATION ADULT - SPECIFY REASON(S)
140 pm Hold PT at this time ; pt still in Recovery/ PACU ; being weaned off anesthesia; pt. is s/p lumbar fecetectomy. Will f/u.

## 2021-10-22 NOTE — ASU PREOP CHECKLIST - NS PREOP CHK TEST_COVID_DT_GEN_ALL_CORE
19-Oct-2021
Alert-The patient is alert, awake and responds to voice. The patient is oriented to time, place, and person. The triage nurse is able to obtain subjective information.

## 2021-10-22 NOTE — CONSULT NOTE ADULT - SUBJECTIVE AND OBJECTIVE BOX
HPI:      PAST MEDICAL & SURGICAL HISTORY:  Back pain    History of surgery  shoulder surg        Hospital Course:  PT to start tomorrow.  TODAY'S SUBJECTIVE & REVIEW OF SYMPTOMS:     Constitutional WNL   Cardio WNL   Resp WNL   GI WNL  Heme WNL  Endo WNL  Skin WNL  MSK WNL  Neuro WNL  Cognitive WNL  Psych WNL      MEDICATIONS  (STANDING):  ceFAZolin   IVPB 1000 milliGRAM(s) IV Intermittent every 8 hours  furosemide    Tablet 40 milliGRAM(s) Oral daily  methocarbamol 750 milliGRAM(s) Oral every 6 hours  pantoprazole    Tablet 40 milliGRAM(s) Oral before breakfast  sodium chloride 0.9%. 1000 milliLiter(s) (100 mL/Hr) IV Continuous <Continuous>    MEDICATIONS  (PRN):  bisacodyl 5 milliGRAM(s) Oral daily PRN Constipation  morphine  - Injectable 2 milliGRAM(s) IV Push every 3 hours PRN Severe Pain (7 - 10)  oxycodone    5 mG/acetaminophen 325 mG 1 Tablet(s) Oral every 4 hours PRN Mild Pain (1 - 3)  oxycodone    5 mG/acetaminophen 325 mG 2 Tablet(s) Oral every 6 hours PRN Moderate Pain (4 - 6)  senna 2 Tablet(s) Oral at bedtime PRN Constipation      FAMILY HISTORY:  FH: asthma (Father)        Allergies    No Known Allergies    Intolerances        SOCIAL HISTORY:    [  ] Etoh  [  ] Smoking  [  ] Substance abuse     Home Environment:  [  ] Home Alone  [x  ] Lives with Family-wife  [  ] Home Health Aid    Dwelling:  [  ] Apartment  [  ] Private House  [  ] Adult Home  [  ] Skilled Nursing Facility      [  ] Short Term  [  ] Long Term  [x  ] Stairs-2 steps to enter       Elevator [  ]    FUNCTIONAL STATUS PTA: (Check all that apply)  Ambulation: [ x  ]Independent    [  ] Dependent     [  ] Non-Ambulatory  Assistive Device: [ x ] SA Cane  [  ]  Q Cane  [  ] Walker  [  ]  Wheelchair  ADL : [  ] Independent  [  ]  Dependent       Vital Signs Last 24 Hrs  T(C): 36.5 (22 Oct 2021 15:37), Max: 36.9 (22 Oct 2021 12:22)  T(F): 97.7 (22 Oct 2021 15:37), Max: 98.5 (22 Oct 2021 12:22)  HR: 91 (22 Oct 2021 15:37) (80 - 100)  BP: 137/70 (22 Oct 2021 15:37) (126/72 - 150/67)  BP(mean): --  RR: 20 (22 Oct 2021 15:37) (13 - 20)  SpO2: 94% (22 Oct 2021 13:50) (94% - 96%)      PHYSICAL EXAM: Alert & Oriented X3  GENERAL: NAD, well-groomed, well-developed  HEAD:  Atraumatic, Normocephalic  EYES: EOMI, PERRLA, conjunctiva and sclera clear  NECK: Supple, No JVD, Normal thyroid  CHEST/LUNG: Clear bilaterally; No rales, rhonchi, wheezing, or rubs  HEART: Regular rate and rhythm; No murmurs, rubs, or gallops  ABDOMEN: Soft, Nontender, Nondistended; Bowel sounds present  EXTREMITIES:  2+ Peripheral Pulses, No clubbing, cyanosis, or edema    NERVOUS SYSTEM:  Cranial Nerves 2-12 intact [  ] Abnormal  [  ]  ROM: WFL all extremities [  ]  Abnormal [  ]  Motor Strength: WFL all extremities  [  ]  Abnormal [x  ] 4+/5 Le's  Sensation: intact to light touch [  ] Abnormal [  ]  Reflexes: Symmetric [  ]  Abnormal [  ]    FUNCTIONAL STATUS:  Bed Mobility: Independent [  ]  Supervision [  ]  Needs Assistance [  ]  N/A [  ]  Transfers: Independent [  ]  Supervision [  ]  Needs Assistance [  ]  N/A [  ]   Ambulation: Independent [  ]  Supervision [  ]  Needs Assistance [  ]  N/A [  ]  ADL: Independent [  ] Requires Assistance [  ] N/A [  ]      LABS:                RADIOLOGY & ADDITIONAL STUDIES:    Assesment:

## 2021-10-22 NOTE — CONSULT NOTE ADULT - ASSESSMENT
IMPRESSION: Rehab of  LBP, s/p facetectomy    PRECAUTIONS: [x  ] Cardiac  [  ] Respiratory  [  ] Seizures [  ] Contact Isolation  [  ] Droplet Isolation  [  ] Other    Weight Bearing Status:     RECOMMENDATION:    Out of Bed to Chair     DVT/Decubiti Prophylaxis    REHAB PLAN:     [  x ] Bedside P/T 3-5 times a week   [   ]   Bedside O/T  2-3 times a week             [   ] No Rehab Therapy Indicated                   [   ]  Speech Therapy   Conditioning/ROM                                    ADL  Bed Mobility                                               Conditioning/ROM  Transfers                                                     Bed Mobility  Sitting /Standing Balance                         Transfers                                        Gait Training                                               Sitting/Standing Balance  Stair Training [   ]Applicable                    Home equipment Eval                                                                        Splinting  [   ] Only      GOALS:   ADL   [x  ]   Independent                    Transfers  [ x ] Independent                          Ambulation  [ x  ] Independent     [ x   ] With device                            [   ]  CG                                                         [   ]  CG                                                                  [   ] CG                            [    ] Min A                                                   [   ] Min A                                                              [   ] Min  A          DISCHARGE PLAN:   [   ]  Good candidate for Intensive Rehabilitation/Hospital based-4A [facility]                                             Will tolerate 3hrs Intensive Rehab Daily                                       [    ]  Short Term Rehab in Skilled Nursing Facility                                       [  x  ]  Home with Outpatient or  services                                         [    ]  Possible Candidate for Intensive Hospital based Rehab

## 2021-10-22 NOTE — PROGRESS NOTE ADULT - SUBJECTIVE AND OBJECTIVE BOX
Post op check       S/P l3-4 diskectomy with L4-5 coflex stabilization     pt seen and examined at bedside pt is alert, has c/o some  incisional pain no radicular pain       Vital Signs Last 24 Hrs  T(C): 36.5 (22 Oct 2021 15:37), Max: 36.9 (22 Oct 2021 12:22)  T(F): 97.7 (22 Oct 2021 15:37), Max: 98.5 (22 Oct 2021 12:22)  HR: 91 (22 Oct 2021 15:37) (80 - 100)  BP: 137/70 (22 Oct 2021 15:37) (126/72 - 150/67)  BP(mean): --  RR: 20 (22 Oct 2021 15:37) (13 - 20)  SpO2: 94% (22 Oct 2021 13:50) (94% - 96%)    PHYSICAL EXAM:  Alert, MAEX4   MS bilateral UE's 5/5         bilateral LE's 5-/5 secondary to discomfort   Back incision site C/D/ I  GUILLERMO intact       GUILLERMO 90 cc           MEDICATIONS:  Antibiotics:  ceFAZolin   IVPB 1000 milliGRAM(s) IV Intermittent every 8 hours    Neuro:  methocarbamol 750 milliGRAM(s) Oral every 6 hours  morphine  - Injectable 2 milliGRAM(s) IV Push every 3 hours PRN  oxycodone    5 mG/acetaminophen 325 mG 1 Tablet(s) Oral every 4 hours PRN  oxycodone    5 mG/acetaminophen 325 mG 2 Tablet(s) Oral every 6 hours PRN    Anticoagulation:    OTHER:  bisacodyl 5 milliGRAM(s) Oral daily PRN  furosemide    Tablet 40 milliGRAM(s) Oral daily  pantoprazole    Tablet 40 milliGRAM(s) Oral before breakfast  senna 2 Tablet(s) Oral at bedtime PRN    IVF:  sodium chloride 0.9%. 1000 milliLiter(s) IV Continuous <Continuous>        A/.P           S/P L3-4 diskectomy with L4-5 coflex stabilization                 pain control                 Monitor GUILLERMO output q shift                 Start DVT prophylaxis tomorrow                Incentive spirometry                 encourage OOB                 PT/OT/Rehab

## 2021-10-22 NOTE — ASU PATIENT PROFILE, ADULT - MEDICATION ADMINISTRATION INFO, PROFILE
no concerns [Recent Change In Weight] : ~T recent weight change [FreeTextEntry2] : lost 5 lbs. [Negative] : Heme/Lymph

## 2021-10-23 LAB
ALBUMIN SERPL ELPH-MCNC: 3.5 G/DL — SIGNIFICANT CHANGE UP (ref 3.5–5.2)
ALP SERPL-CCNC: 70 U/L — SIGNIFICANT CHANGE UP (ref 30–115)
ALT FLD-CCNC: 10 U/L — SIGNIFICANT CHANGE UP (ref 0–41)
ANION GAP SERPL CALC-SCNC: 12 MMOL/L — SIGNIFICANT CHANGE UP (ref 7–14)
AST SERPL-CCNC: 12 U/L — SIGNIFICANT CHANGE UP (ref 0–41)
BASOPHILS # BLD AUTO: 0.04 K/UL — SIGNIFICANT CHANGE UP (ref 0–0.2)
BASOPHILS NFR BLD AUTO: 0.3 % — SIGNIFICANT CHANGE UP (ref 0–1)
BILIRUB SERPL-MCNC: 1.6 MG/DL — HIGH (ref 0.2–1.2)
BUN SERPL-MCNC: 14 MG/DL — SIGNIFICANT CHANGE UP (ref 10–20)
CALCIUM SERPL-MCNC: 8.5 MG/DL — SIGNIFICANT CHANGE UP (ref 8.5–10.1)
CHLORIDE SERPL-SCNC: 104 MMOL/L — SIGNIFICANT CHANGE UP (ref 98–110)
CO2 SERPL-SCNC: 21 MMOL/L — SIGNIFICANT CHANGE UP (ref 17–32)
COVID-19 SPIKE DOMAIN AB INTERP: POSITIVE
COVID-19 SPIKE DOMAIN ANTIBODY RESULT: 207 U/ML — HIGH
CREAT SERPL-MCNC: 0.7 MG/DL — SIGNIFICANT CHANGE UP (ref 0.7–1.5)
EOSINOPHIL # BLD AUTO: 0.06 K/UL — SIGNIFICANT CHANGE UP (ref 0–0.7)
EOSINOPHIL NFR BLD AUTO: 0.5 % — SIGNIFICANT CHANGE UP (ref 0–8)
GLUCOSE BLDC GLUCOMTR-MCNC: 167 MG/DL — HIGH (ref 70–99)
GLUCOSE SERPL-MCNC: 134 MG/DL — HIGH (ref 70–99)
HCT VFR BLD CALC: 41.4 % — LOW (ref 42–52)
HGB BLD-MCNC: 14.2 G/DL — SIGNIFICANT CHANGE UP (ref 14–18)
IMM GRANULOCYTES NFR BLD AUTO: 0.4 % — HIGH (ref 0.1–0.3)
LYMPHOCYTES # BLD AUTO: 1.94 K/UL — SIGNIFICANT CHANGE UP (ref 1.2–3.4)
LYMPHOCYTES # BLD AUTO: 16.4 % — LOW (ref 20.5–51.1)
MCHC RBC-ENTMCNC: 29.8 PG — SIGNIFICANT CHANGE UP (ref 27–31)
MCHC RBC-ENTMCNC: 34.3 G/DL — SIGNIFICANT CHANGE UP (ref 32–37)
MCV RBC AUTO: 86.8 FL — SIGNIFICANT CHANGE UP (ref 80–94)
MONOCYTES # BLD AUTO: 0.88 K/UL — HIGH (ref 0.1–0.6)
MONOCYTES NFR BLD AUTO: 7.4 % — SIGNIFICANT CHANGE UP (ref 1.7–9.3)
NEUTROPHILS # BLD AUTO: 8.85 K/UL — HIGH (ref 1.4–6.5)
NEUTROPHILS NFR BLD AUTO: 75 % — SIGNIFICANT CHANGE UP (ref 42.2–75.2)
NRBC # BLD: 0 /100 WBCS — SIGNIFICANT CHANGE UP (ref 0–0)
PLATELET # BLD AUTO: 199 K/UL — SIGNIFICANT CHANGE UP (ref 130–400)
POTASSIUM SERPL-MCNC: 4 MMOL/L — SIGNIFICANT CHANGE UP (ref 3.5–5)
POTASSIUM SERPL-SCNC: 4 MMOL/L — SIGNIFICANT CHANGE UP (ref 3.5–5)
PROT SERPL-MCNC: 5.1 G/DL — LOW (ref 6–8)
RBC # BLD: 4.77 M/UL — SIGNIFICANT CHANGE UP (ref 4.7–6.1)
RBC # FLD: 12 % — SIGNIFICANT CHANGE UP (ref 11.5–14.5)
SARS-COV-2 IGG+IGM SERPL QL IA: 207 U/ML — HIGH
SARS-COV-2 IGG+IGM SERPL QL IA: POSITIVE
SODIUM SERPL-SCNC: 137 MMOL/L — SIGNIFICANT CHANGE UP (ref 135–146)
WBC # BLD: 11.82 K/UL — HIGH (ref 4.8–10.8)
WBC # FLD AUTO: 11.82 K/UL — HIGH (ref 4.8–10.8)

## 2021-10-23 RX ORDER — POLYETHYLENE GLYCOL 3350 17 G/17G
17 POWDER, FOR SOLUTION ORAL DAILY
Refills: 0 | Status: DISCONTINUED | OUTPATIENT
Start: 2021-10-23 | End: 2021-10-27

## 2021-10-23 RX ADMIN — MORPHINE SULFATE 2 MILLIGRAM(S): 50 CAPSULE, EXTENDED RELEASE ORAL at 05:20

## 2021-10-23 RX ADMIN — METHOCARBAMOL 750 MILLIGRAM(S): 500 TABLET, FILM COATED ORAL at 23:01

## 2021-10-23 RX ADMIN — POLYETHYLENE GLYCOL 3350 17 GRAM(S): 17 POWDER, FOR SOLUTION ORAL at 11:41

## 2021-10-23 RX ADMIN — HEPARIN SODIUM 5000 UNIT(S): 5000 INJECTION INTRAVENOUS; SUBCUTANEOUS at 05:10

## 2021-10-23 RX ADMIN — Medication 100 MILLIGRAM(S): at 17:20

## 2021-10-23 RX ADMIN — SODIUM CHLORIDE 100 MILLILITER(S): 9 INJECTION INTRAMUSCULAR; INTRAVENOUS; SUBCUTANEOUS at 02:27

## 2021-10-23 RX ADMIN — HEPARIN SODIUM 5000 UNIT(S): 5000 INJECTION INTRAVENOUS; SUBCUTANEOUS at 15:36

## 2021-10-23 RX ADMIN — METHOCARBAMOL 750 MILLIGRAM(S): 500 TABLET, FILM COATED ORAL at 17:21

## 2021-10-23 RX ADMIN — METHOCARBAMOL 750 MILLIGRAM(S): 500 TABLET, FILM COATED ORAL at 11:41

## 2021-10-23 RX ADMIN — Medication 100 MILLIGRAM(S): at 23:01

## 2021-10-23 RX ADMIN — METHOCARBAMOL 750 MILLIGRAM(S): 500 TABLET, FILM COATED ORAL at 05:10

## 2021-10-23 RX ADMIN — Medication 100 MILLIGRAM(S): at 08:29

## 2021-10-23 RX ADMIN — OXYCODONE AND ACETAMINOPHEN 1 TABLET(S): 5; 325 TABLET ORAL at 17:47

## 2021-10-23 RX ADMIN — PANTOPRAZOLE SODIUM 40 MILLIGRAM(S): 20 TABLET, DELAYED RELEASE ORAL at 05:10

## 2021-10-23 RX ADMIN — Medication 40 MILLIGRAM(S): at 05:10

## 2021-10-23 RX ADMIN — OXYCODONE AND ACETAMINOPHEN 2 TABLET(S): 5; 325 TABLET ORAL at 06:09

## 2021-10-23 RX ADMIN — HEPARIN SODIUM 5000 UNIT(S): 5000 INJECTION INTRAVENOUS; SUBCUTANEOUS at 22:50

## 2021-10-23 NOTE — PHYSICAL THERAPY INITIAL EVALUATION ADULT - GAIT DEVIATIONS NOTED, PT EVAL
decreased marie/decreased velocity of limb motion/decreased step length/decreased weight-shifting ability

## 2021-10-23 NOTE — PHYSICAL THERAPY INITIAL EVALUATION ADULT - GAIT TRAINING, PT EVAL
Goal: pt will ambulate with least restrictive assistive device 50 feet with contact guard by discharge to facilitate return to PLOF.

## 2021-10-23 NOTE — PROGRESS NOTE ADULT - SUBJECTIVE AND OBJECTIVE BOX
POD # 1    S/P L3-4 diskectomy with L4-5 coflex stabilization     pt seen and examined at bedside pt is alert no complaints at radicular pain         Vital Signs Last 24 Hrs  T(C): 36.2 (23 Oct 2021 04:52), Max: 36.9 (22 Oct 2021 12:22)  T(F): 97.2 (23 Oct 2021 04:52), Max: 98.5 (22 Oct 2021 12:22)  HR: 79 (23 Oct 2021 04:52) (79 - 101)  BP: 126/66 (23 Oct 2021 04:52) (119/63 - 150/67)  BP(mean): 83 (22 Oct 2021 20:02) (83 - 83)  RR: 18 (23 Oct 2021 04:52) (13 - 20)  SpO2: 94% (22 Oct 2021 13:50) (94% - 96%)    PHYSICAL EXAM:    Alert, MAEX4   moves bilateral UE's with good strength   moves bilateral LE's with good strength   incision clean dry intact   GUILLERMO 75 cc       MEDICATIONS:  Antibiotics:  ceFAZolin   IVPB 1000 milliGRAM(s) IV Intermittent every 8 hours    Neuro:  methocarbamol 750 milliGRAM(s) Oral every 6 hours  morphine  - Injectable 2 milliGRAM(s) IV Push every 3 hours PRN  oxycodone    5 mG/acetaminophen 325 mG 1 Tablet(s) Oral every 4 hours PRN  oxycodone    5 mG/acetaminophen 325 mG 2 Tablet(s) Oral every 6 hours PRN    Anticoagulation:  heparin   Injectable 5000 Unit(s) SubCutaneous every 8 hours    OTHER:  bisacodyl 5 milliGRAM(s) Oral daily PRN  furosemide    Tablet 40 milliGRAM(s) Oral daily  pantoprazole    Tablet 40 milliGRAM(s) Oral before breakfast  polyethylene glycol 3350 17 Gram(s) Oral daily  senna 2 Tablet(s) Oral at bedtime PRN    IVF:  sodium chloride 0.9%. 1000 milliLiter(s) IV Continuous <Continuous>    A/p           S/P L3-4 diskectomy with L4-5 coflex stabilization                 DC Bacon                 Monitor GUILLERMO output                 continue DVT prophylaxis                 pain control                 PT/OT/Rehab

## 2021-10-23 NOTE — PHYSICAL THERAPY INITIAL EVALUATION ADULT - GENERAL OBSERVATIONS, REHAB EVAL
pt. encountered in bed in NAD with wife present. pt c/o pain 8/10 due to lying on surgical site, 10/10 with activity and 2/10 in supine at completion of therapy. [id number]

## 2021-10-24 RX ORDER — BACITRACIN ZINC 500 UNIT/G
1 OINTMENT IN PACKET (EA) TOPICAL EVERY 12 HOURS
Refills: 0 | Status: DISCONTINUED | OUTPATIENT
Start: 2021-10-24 | End: 2021-10-27

## 2021-10-24 RX ORDER — ACETAMINOPHEN 500 MG
650 TABLET ORAL EVERY 6 HOURS
Refills: 0 | Status: DISCONTINUED | OUTPATIENT
Start: 2021-10-24 | End: 2021-10-27

## 2021-10-24 RX ADMIN — Medication 40 MILLIGRAM(S): at 05:49

## 2021-10-24 RX ADMIN — HEPARIN SODIUM 5000 UNIT(S): 5000 INJECTION INTRAVENOUS; SUBCUTANEOUS at 21:26

## 2021-10-24 RX ADMIN — HEPARIN SODIUM 5000 UNIT(S): 5000 INJECTION INTRAVENOUS; SUBCUTANEOUS at 05:49

## 2021-10-24 RX ADMIN — Medication 100 MILLIGRAM(S): at 21:25

## 2021-10-24 RX ADMIN — METHOCARBAMOL 750 MILLIGRAM(S): 500 TABLET, FILM COATED ORAL at 11:21

## 2021-10-24 RX ADMIN — METHOCARBAMOL 750 MILLIGRAM(S): 500 TABLET, FILM COATED ORAL at 17:41

## 2021-10-24 RX ADMIN — Medication 100 MILLIGRAM(S): at 11:21

## 2021-10-24 RX ADMIN — Medication 1 APPLICATION(S): at 19:26

## 2021-10-24 RX ADMIN — METHOCARBAMOL 750 MILLIGRAM(S): 500 TABLET, FILM COATED ORAL at 23:21

## 2021-10-24 RX ADMIN — PANTOPRAZOLE SODIUM 40 MILLIGRAM(S): 20 TABLET, DELAYED RELEASE ORAL at 05:49

## 2021-10-24 RX ADMIN — Medication 650 MILLIGRAM(S): at 21:00

## 2021-10-24 RX ADMIN — Medication 650 MILLIGRAM(S): at 21:25

## 2021-10-24 RX ADMIN — POLYETHYLENE GLYCOL 3350 17 GRAM(S): 17 POWDER, FOR SOLUTION ORAL at 11:21

## 2021-10-24 RX ADMIN — METHOCARBAMOL 750 MILLIGRAM(S): 500 TABLET, FILM COATED ORAL at 05:49

## 2021-10-24 RX ADMIN — HEPARIN SODIUM 5000 UNIT(S): 5000 INJECTION INTRAVENOUS; SUBCUTANEOUS at 14:29

## 2021-10-24 RX ADMIN — OXYCODONE AND ACETAMINOPHEN 2 TABLET(S): 5; 325 TABLET ORAL at 00:05

## 2021-10-24 NOTE — PROGRESS NOTE ADULT - SUBJECTIVE AND OBJECTIVE BOX
POD # 2    S/P L3-4 diskectomy with L4-5 coflex stabilization     pt seen and examined at bedside pt is c/o incisional discomfort , states he is moving he LE's better today no c/o radicular pain     Vital Signs Last 24 Hrs  T(C): 36.4 (24 Oct 2021 04:39), Max: 37.2 (23 Oct 2021 13:31)  T(F): 97.5 (24 Oct 2021 04:39), Max: 98.9 (23 Oct 2021 13:31)  HR: 72 (24 Oct 2021 04:39) (72 - 81)  BP: 109/56 (24 Oct 2021 04:39) (109/56 - 121/59)  BP(mean): --  RR: 18 (24 Oct 2021 04:39) (17 - 18)  SpO2: --    PHYSICAL EXAM:    Alert, MAEX4   MS bilateral UE's 5/5        bilateral LE's 5/5   incision dressing changed has a blister above the tegaderm new dressing placed with  paper tape   sensory intact   DTR's 2+ bilateral       MEDICATIONS:  Antibiotics:  ceFAZolin   IVPB 1000 milliGRAM(s) IV Intermittent every 8 hours    Neuro:  methocarbamol 750 milliGRAM(s) Oral every 6 hours  morphine  - Injectable 2 milliGRAM(s) IV Push every 3 hours PRN  oxycodone    5 mG/acetaminophen 325 mG 1 Tablet(s) Oral every 4 hours PRN  oxycodone    5 mG/acetaminophen 325 mG 2 Tablet(s) Oral every 6 hours PRN    Anticoagulation:  heparin   Injectable 5000 Unit(s) SubCutaneous every 8 hours    OTHER:  bisacodyl 5 milliGRAM(s) Oral daily PRN  furosemide    Tablet 40 milliGRAM(s) Oral daily  pantoprazole    Tablet 40 milliGRAM(s) Oral before breakfast  polyethylene glycol 3350 17 Gram(s) Oral daily  senna 2 Tablet(s) Oral at bedtime PRN    IVF:        LABS:                        14.2   11.82 )-----------( 199      ( 23 Oct 2021 07:45 )             41.4     10-23    137  |  104  |  14  ----------------------------<  134<H>  4.0   |  21  |  0.7    Ca    8.5      23 Oct 2021 07:45    TPro  5.1<L>  /  Alb  3.5  /  TBili  1.6<H>  /  DBili  x   /  AST  12  /  ALT  10  /  AlkPhos  70  10-23        A/p                S/P L3-4 diskectomy with L4-5 coflex stabilization                     monitor GUILLERMO output                     pain control                     PT/OT/Rehab

## 2021-10-25 PROCEDURE — 74018 RADEX ABDOMEN 1 VIEW: CPT | Mod: 26

## 2021-10-25 RX ORDER — SENNA PLUS 8.6 MG/1
2 TABLET ORAL AT BEDTIME
Refills: 0 | Status: DISCONTINUED | OUTPATIENT
Start: 2021-10-25 | End: 2021-10-27

## 2021-10-25 RX ORDER — CARBIDOPA AND LEVODOPA 25; 100 MG/1; MG/1
1 TABLET ORAL DAILY
Refills: 0 | Status: DISCONTINUED | OUTPATIENT
Start: 2021-10-25 | End: 2021-10-27

## 2021-10-25 RX ADMIN — HEPARIN SODIUM 5000 UNIT(S): 5000 INJECTION INTRAVENOUS; SUBCUTANEOUS at 21:25

## 2021-10-25 RX ADMIN — METHOCARBAMOL 750 MILLIGRAM(S): 500 TABLET, FILM COATED ORAL at 11:49

## 2021-10-25 RX ADMIN — HEPARIN SODIUM 5000 UNIT(S): 5000 INJECTION INTRAVENOUS; SUBCUTANEOUS at 14:06

## 2021-10-25 RX ADMIN — Medication 40 MILLIGRAM(S): at 05:13

## 2021-10-25 RX ADMIN — HEPARIN SODIUM 5000 UNIT(S): 5000 INJECTION INTRAVENOUS; SUBCUTANEOUS at 05:14

## 2021-10-25 RX ADMIN — PANTOPRAZOLE SODIUM 40 MILLIGRAM(S): 20 TABLET, DELAYED RELEASE ORAL at 07:40

## 2021-10-25 RX ADMIN — Medication 5 MILLIGRAM(S): at 14:06

## 2021-10-25 RX ADMIN — Medication 1 APPLICATION(S): at 05:13

## 2021-10-25 RX ADMIN — SENNA PLUS 2 TABLET(S): 8.6 TABLET ORAL at 21:25

## 2021-10-25 RX ADMIN — POLYETHYLENE GLYCOL 3350 17 GRAM(S): 17 POWDER, FOR SOLUTION ORAL at 11:50

## 2021-10-25 RX ADMIN — CARBIDOPA AND LEVODOPA 1 TABLET(S): 25; 100 TABLET ORAL at 14:07

## 2021-10-25 RX ADMIN — METHOCARBAMOL 750 MILLIGRAM(S): 500 TABLET, FILM COATED ORAL at 05:13

## 2021-10-25 NOTE — OCCUPATIONAL THERAPY INITIAL EVALUATION ADULT - GENERAL OBSERVATIONS, REHAB EVAL
OT eval: 9:25-9:57 Pt received semi nieves in bed in NAD. Pt agreeable to OT eval.  Drainage from incision observed, SERENITY Alejandro aware and stated she informed NS via phone call.

## 2021-10-25 NOTE — PROGRESS NOTE ADULT - ASSESSMENT
Imp : lumbar stenosis / s/p L3-5 laminectomy and L4-5 stabilization  Plan : continue bedside therapy as tolerated           Pt can tolerate 3hr PT/OT daily           Good 4a acute inpatient rehab candidate

## 2021-10-25 NOTE — PROGRESS NOTE ADULT - SUBJECTIVE AND OBJECTIVE BOX
Patient is a 70y old  Male who presents with a chief complaint of LBP      HPI: S/P L3-4 diskectomy with L4-5 coflex stabilization on 10/22/21          PHYSICAL EXAM    Vital Signs Last 24 Hrs  T(C): 36.3 (25 Oct 2021 13:49), Max: 36.4 (24 Oct 2021 21:37)  T(F): 97.3 (25 Oct 2021 13:49), Max: 97.6 (24 Oct 2021 21:37)  HR: 104 (25 Oct 2021 13:49) (82 - 104)  BP: 133/81 (25 Oct 2021 13:49) (133/81 - 153/76)  BP(mean): 93 (24 Oct 2021 21:37) (93 - 93)  RR: 18 (25 Oct 2021 13:49) (18 - 18)  SpO2: --    Constitutional - NAD  Chest - CTA  Cardiovascular - S1S2+  Abdomen -  Soft  Extremities -  No calf tenderness   Function : transfer mod A                 ambulate 40'x2 RW CG / unable to negotiate steps    acetaminophen     Tablet .. 650 milliGRAM(s) Oral every 6 hours PRN  BACItracin   Ointment 1 Application(s) Topical every 12 hours  bisacodyl 5 milliGRAM(s) Oral daily  bisacodyl 5 milliGRAM(s) Oral daily PRN  carbidopa/levodopa  10/100 1 Tablet(s) Oral daily  furosemide    Tablet 40 milliGRAM(s) Oral daily  heparin   Injectable 5000 Unit(s) SubCutaneous every 8 hours  methocarbamol 750 milliGRAM(s) Oral every 6 hours  morphine  - Injectable 2 milliGRAM(s) IV Push every 3 hours PRN  oxycodone    5 mG/acetaminophen 325 mG 1 Tablet(s) Oral every 4 hours PRN  oxycodone    5 mG/acetaminophen 325 mG 2 Tablet(s) Oral every 6 hours PRN  pantoprazole    Tablet 40 milliGRAM(s) Oral before breakfast  polyethylene glycol 3350 17 Gram(s) Oral daily  senna 2 Tablet(s) Oral at bedtime      RECENT LABS/IMAGING

## 2021-10-25 NOTE — OCCUPATIONAL THERAPY INITIAL EVALUATION ADULT - TRANSFER TRAINING, PT EVAL
Pt will transfer bed to chair and transfer to toilet with commode with CG assist by discharge to work towards returning to PLOF

## 2021-10-25 NOTE — PROGRESS NOTE ADULT - SUBJECTIVE AND OBJECTIVE BOX
Neurosurgery Progress Note    POD# 2  S/P L3-4 diskectomy with L4-5 coflex stabilization       Pt seen and examined at the bedside.  No major over night events.  At present time the pt is resting in bed, in NAD and hemodynamically stable.  PT endorsed an unrestful night 2ndary to an uncomfortablebed, in addition he is experiencing mild intermittent incisional pain about the lower lumbar region.  Tolerating a diet, no BM since the procedure, will ambulate today and adjust bowel regimen  PT denies: paraesthesia anaesthesia radicular pain, weaknesses        Subjective: 70yMale with a pmhx of M48.061, 99549, 73739    ^M48.061, 74330, 01139    FH: asthma (Father)    Handoff    MEWS Score    Back pain    Diabetes    S/P shoulder surgery    Lumbar facetectomy    History of surgery    SysAdmin_VstLnk      POD# 2  S/P L3-4 diskectomy with L4-5 coflex stabilization     Allergies    No Known Allergies    Intolerances        Vital Signs Last 24 Hrs  T(C): 36.4 (25 Oct 2021 04:19), Max: 36.8 (24 Oct 2021 13:49)  T(F): 97.5 (25 Oct 2021 04:19), Max: 98.3 (24 Oct 2021 13:49)  HR: 82 (25 Oct 2021 04:19) (82 - 93)  BP: 153/76 (25 Oct 2021 04:19) (134/67 - 153/76)  BP(mean): 93 (24 Oct 2021 21:37) (93 - 93)  RR: 18 (25 Oct 2021 04:19) (18 - 19)  SpO2: --      acetaminophen     Tablet .. 650 milliGRAM(s) Oral every 6 hours PRN  BACItracin   Ointment 1 Application(s) Topical every 12 hours  bisacodyl 5 milliGRAM(s) Oral daily  bisacodyl 5 milliGRAM(s) Oral daily PRN  carbidopa/levodopa  10/100 1 Tablet(s) Oral daily  ceFAZolin   IVPB 1000 milliGRAM(s) IV Intermittent every 8 hours  furosemide    Tablet 40 milliGRAM(s) Oral daily  heparin   Injectable 5000 Unit(s) SubCutaneous every 8 hours  methocarbamol 750 milliGRAM(s) Oral every 6 hours  morphine  - Injectable 2 milliGRAM(s) IV Push every 3 hours PRN  oxycodone    5 mG/acetaminophen 325 mG 1 Tablet(s) Oral every 4 hours PRN  oxycodone    5 mG/acetaminophen 325 mG 2 Tablet(s) Oral every 6 hours PRN  pantoprazole    Tablet 40 milliGRAM(s) Oral before breakfast  polyethylene glycol 3350 17 Gram(s) Oral daily  senna 2 Tablet(s) Oral at bedtime        10-24-21 @ 07:01  -  10-25-21 @ 07:00  --------------------------------------------------------  IN: 0 mL / OUT: 200 mL / NET: -200 mL    10-25-21 @ 07:01  -  10-25-21 @ 09:34  --------------------------------------------------------  IN: 0 mL / OUT: 800 mL / NET: -800 mL        REVIEW OF SYSTEMS    [x ] A ten-point review of systems was otherwise negative except as noted.  [ ] Due to altered mental status/intubation, subjective information were not able to be obtained from the patient. History was obtained, to the extent possible, from review of the chart and collateral sources of information.      Exam:  AAOX3. Verbal function intact  tongue midline, facial motions symmetric  PERRLA, EOMI   Finger to Nose intact  Motor: MAEx4, 5/5 power in b/l UE and LE  5/5 plantar/dorsiflexion  5/5 B/L   DTR's 2+ bilateral   Sensation: SILT            Wound:        Assessment/Plan:  This is a 70 year old gentleman, POD# 2 S/P L3-4 diskectomy with L4-5 coflex stabilization     Start Sinemet 10/100mg Daily  Pain control: Robaxin 750mg Q6hr, PRN Tylenol , PRN percocet, PRN morphine  Encourage incentive spirometry   GI: Tolerating PO diet, GI prophylaxis, Senna/Doculax/Miralax daily  ID: GUILLERMO out 10.24, DC abx  PT rehab  DVT prophylaxis: SQH  Disp: pending PT eval, BM    Plan and care discussed with Dr Sanchez, pt, nursing staff   Neurosurgery Progress Note    POD# 2  S/P L3-4 diskectomy with L4-5 coflex stabilization       Pt seen and examined at the bedside.  No major over night events.  At present time the pt is resting in bed, in NAD and hemodynamically stable.  PT endorsed an unrestful night 2ndary to an uncomfortablebed, in addition he is experiencing mild intermittent incisional pain about the lower lumbar region.  Tolerating a diet, no BM since the procedure, will ambulate today and adjust bowel regimen  PT denies: paraesthesia anaesthesia radicular pain, weaknesses        Subjective: 70yMale with a pmhx of M48.061, 23314, 62305    ^M48.061, 88245, 82559    FH: asthma (Father)    Handoff    MEWS Score    Back pain    Diabetes    S/P shoulder surgery    Lumbar facetectomy    History of surgery    SysAdmin_VstLnk      POD# 2  S/P L3-4 diskectomy with L4-5 coflex stabilization     Allergies    No Known Allergies    Intolerances        Vital Signs Last 24 Hrs  T(C): 36.4 (25 Oct 2021 04:19), Max: 36.8 (24 Oct 2021 13:49)  T(F): 97.5 (25 Oct 2021 04:19), Max: 98.3 (24 Oct 2021 13:49)  HR: 82 (25 Oct 2021 04:19) (82 - 93)  BP: 153/76 (25 Oct 2021 04:19) (134/67 - 153/76)  BP(mean): 93 (24 Oct 2021 21:37) (93 - 93)  RR: 18 (25 Oct 2021 04:19) (18 - 19)  SpO2: --      acetaminophen     Tablet .. 650 milliGRAM(s) Oral every 6 hours PRN  BACItracin   Ointment 1 Application(s) Topical every 12 hours  bisacodyl 5 milliGRAM(s) Oral daily  bisacodyl 5 milliGRAM(s) Oral daily PRN  carbidopa/levodopa  10/100 1 Tablet(s) Oral daily  ceFAZolin   IVPB 1000 milliGRAM(s) IV Intermittent every 8 hours  furosemide    Tablet 40 milliGRAM(s) Oral daily  heparin   Injectable 5000 Unit(s) SubCutaneous every 8 hours  methocarbamol 750 milliGRAM(s) Oral every 6 hours  morphine  - Injectable 2 milliGRAM(s) IV Push every 3 hours PRN  oxycodone    5 mG/acetaminophen 325 mG 1 Tablet(s) Oral every 4 hours PRN  oxycodone    5 mG/acetaminophen 325 mG 2 Tablet(s) Oral every 6 hours PRN  pantoprazole    Tablet 40 milliGRAM(s) Oral before breakfast  polyethylene glycol 3350 17 Gram(s) Oral daily  senna 2 Tablet(s) Oral at bedtime        10-24-21 @ 07:01  -  10-25-21 @ 07:00  --------------------------------------------------------  IN: 0 mL / OUT: 200 mL / NET: -200 mL    10-25-21 @ 07:01  -  10-25-21 @ 09:34  --------------------------------------------------------  IN: 0 mL / OUT: 800 mL / NET: -800 mL        REVIEW OF SYSTEMS    [x ] A ten-point review of systems was otherwise negative except as noted.  [ ] Due to altered mental status/intubation, subjective information were not able to be obtained from the patient. History was obtained, to the extent possible, from review of the chart and collateral sources of information.      Exam:  AAOX3. Verbal function intact  tongue midline, facial motions symmetric  PERRLA, EOMI   Finger to Nose intact  Motor: MAEx4, 5/5 power in b/l UE and LE  5/5 plantar/dorsiflexion  5/5 B/L   DTR's 2+ bilateral   Sensation: SILT            Wound:        Assessment/Plan:  This is a 70 year old gentleman, POD# 2 S/P L3-4 diskectomy with L4-5 coflex stabilization     Start Sinemet 10/100mg Daily  Continue applying Bacitracin to blisters  Pain control: Robaxin 750mg Q6hr, PRN Tylenol , PRN percocet, PRN morphine  Encourage incentive spirometry   GI: Tolerating PO diet, GI prophylaxis, Senna/Doculax/Miralax daily  ID: GUILLERMO out 10.24, DC abx  PT rehab  DVT prophylaxis: SQH  Disp: pending PT eval, BM    Plan and care discussed with Dr Sanchez, pt, nursing staff

## 2021-10-25 NOTE — OCCUPATIONAL THERAPY INITIAL EVALUATION ADULT - ANTICIPATED DISCHARGE DISPOSITION, OT EVAL
pt would benefit from an intensive rehab program vs home with assist from family to get to outpatient PT

## 2021-10-26 ENCOUNTER — TRANSCRIPTION ENCOUNTER (OUTPATIENT)
Age: 71
End: 2021-10-26

## 2021-10-26 RX ADMIN — POLYETHYLENE GLYCOL 3350 17 GRAM(S): 17 POWDER, FOR SOLUTION ORAL at 11:40

## 2021-10-26 RX ADMIN — METHOCARBAMOL 750 MILLIGRAM(S): 500 TABLET, FILM COATED ORAL at 00:56

## 2021-10-26 RX ADMIN — METHOCARBAMOL 750 MILLIGRAM(S): 500 TABLET, FILM COATED ORAL at 05:15

## 2021-10-26 RX ADMIN — Medication 40 MILLIGRAM(S): at 05:15

## 2021-10-26 RX ADMIN — METHOCARBAMOL 750 MILLIGRAM(S): 500 TABLET, FILM COATED ORAL at 17:00

## 2021-10-26 RX ADMIN — Medication 1 ENEMA: at 16:12

## 2021-10-26 RX ADMIN — PANTOPRAZOLE SODIUM 40 MILLIGRAM(S): 20 TABLET, DELAYED RELEASE ORAL at 05:15

## 2021-10-26 RX ADMIN — Medication 5 MILLIGRAM(S): at 11:41

## 2021-10-26 RX ADMIN — Medication 10 MILLIGRAM(S): at 16:11

## 2021-10-26 RX ADMIN — METHOCARBAMOL 750 MILLIGRAM(S): 500 TABLET, FILM COATED ORAL at 11:40

## 2021-10-26 RX ADMIN — HEPARIN SODIUM 5000 UNIT(S): 5000 INJECTION INTRAVENOUS; SUBCUTANEOUS at 05:15

## 2021-10-26 RX ADMIN — Medication 1 APPLICATION(S): at 17:00

## 2021-10-26 RX ADMIN — HEPARIN SODIUM 5000 UNIT(S): 5000 INJECTION INTRAVENOUS; SUBCUTANEOUS at 14:40

## 2021-10-26 RX ADMIN — Medication 1 APPLICATION(S): at 05:14

## 2021-10-26 RX ADMIN — CARBIDOPA AND LEVODOPA 1 TABLET(S): 25; 100 TABLET ORAL at 11:41

## 2021-10-26 NOTE — PROGRESS NOTE ADULT - SUBJECTIVE AND OBJECTIVE BOX
Subjective: 70yMale with a pmhx of M48.061, 97590, 79926    ^M48.061, 04000, 49931    FH: asthma (Father)    Handoff    MEWS Score    Back pain    Diabetes    S/P shoulder surgery    Lumbar facetectomy    History of surgery    SysAdmin_VstLnk      POD# 3  S/P L3-4 diskectomy with L4-5 coflex stabilization       Pt seen and examined at the bedside with Dr Sanchez.  Pt worked with PT/OT.  Walked 100ft x 2 with RW,  climbed 4 steps.  Pt insurance not covering short term rehab.  Pt hasn't had a  BM since the procedure.  Will add Fleet enema.       Allergies    No Known Allergies    Intolerances        Imaging: < from: Xray Kidney Ureter Bladder (10.25.21 @ 22:12) >  impression:    Nonspecific air distended loops of large and small bowel.    Left abdomen is not included on this image and cannot be evaluated.    Moderate right colonic stool load.    Degenerative change with lumbar spine hardware    < end of copied text >      Vital Signs Last 24 Hrs  T(C): 36.5 (26 Oct 2021 05:13), Max: 36.5 (26 Oct 2021 05:13)  T(F): 97.7 (26 Oct 2021 05:13), Max: 97.7 (26 Oct 2021 05:13)  HR: 63 (26 Oct 2021 05:13) (63 - 90)  BP: 128/63 (26 Oct 2021 05:13) (118/71 - 128/63)  BP(mean): 88 (26 Oct 2021 05:13) (88 - 89)  RR: 18 (26 Oct 2021 05:13) (16 - 18)  SpO2: --      acetaminophen     Tablet .. 650 milliGRAM(s) Oral every 6 hours PRN  BACItracin   Ointment 1 Application(s) Topical every 12 hours  bisacodyl 5 milliGRAM(s) Oral daily PRN  bisacodyl 5 milliGRAM(s) Oral daily  carbidopa/levodopa  10/100 1 Tablet(s) Oral daily  furosemide    Tablet 40 milliGRAM(s) Oral daily  heparin   Injectable 5000 Unit(s) SubCutaneous every 8 hours  methocarbamol 750 milliGRAM(s) Oral every 6 hours  morphine  - Injectable 2 milliGRAM(s) IV Push every 3 hours PRN  oxycodone    5 mG/acetaminophen 325 mG 1 Tablet(s) Oral every 4 hours PRN  oxycodone    5 mG/acetaminophen 325 mG 2 Tablet(s) Oral every 6 hours PRN  pantoprazole    Tablet 40 milliGRAM(s) Oral before breakfast  polyethylene glycol 3350 17 Gram(s) Oral daily  senna 2 Tablet(s) Oral at bedtime        10-25-21 @ 07:01  -  10-26-21 @ 07:00  --------------------------------------------------------  IN: 0 mL / OUT: 1350 mL / NET: -1350 mL        REVIEW OF SYSTEMS    [x ] A ten-point review of systems was otherwise negative except as noted.  [ ] Due to altered mental status/intubation, subjective information were not able to be obtained from the patient. History was obtained, to the extent possible, from review of the chart and collateral sources of information.      Neuro Exam:  AAOX3. Verbal function intact  follows commands  PERRL  Motor: MAEx4  b/l UE's 5/5   b/l LE's 5/5   DF/PF 5/5  Sensation: intact to LT      Wound: dressing changed.  some drainage from previous GUILLERMO site.  New dressing applied w paper tape.                 I&O's Detail    25 Oct 2021 07:01  -  26 Oct 2021 07:00  --------------------------------------------------------  IN:  Total IN: 0 mL    OUT:    Voided (mL): 1350 mL  Total OUT: 1350 mL    Total NET: -1350 mL        I&O's Summary    25 Oct 2021 07:01  -  26 Oct 2021 07:00  --------------------------------------------------------  IN: 0 mL / OUT: 1350 mL / NET: -1350 mL          Assessment/Plan:   cont PT/OT  Rx submitted for RW, 3:1 commode  pain meds  add Fleet enema  BM check  plan for discharge tomorrow with home care  SW for d/c planning  d/w attg

## 2021-10-26 NOTE — DISCHARGE NOTE NURSING/CASE MANAGEMENT/SOCIAL WORK - PATIENT PORTAL LINK FT
You can access the FollowMyHealth Patient Portal offered by [facility] by registering at the following website: [email or web address]. By joining [email or web address] [email or web address] portal, you will also be able to view your health information using other applications (apps) compatible with our system.

## 2021-10-27 ENCOUNTER — TRANSCRIPTION ENCOUNTER (OUTPATIENT)
Age: 71
End: 2021-10-27

## 2021-10-27 VITALS
TEMPERATURE: 98 F | RESPIRATION RATE: 18 BRPM | DIASTOLIC BLOOD PRESSURE: 60 MMHG | SYSTOLIC BLOOD PRESSURE: 135 MMHG | HEART RATE: 61 BPM

## 2021-10-27 RX ORDER — CARBIDOPA AND LEVODOPA 25; 100 MG/1; MG/1
1 TABLET ORAL
Qty: 30 | Refills: 0
Start: 2021-10-27 | End: 2021-11-25

## 2021-10-27 RX ORDER — SENNA PLUS 8.6 MG/1
2 TABLET ORAL
Qty: 0 | Refills: 0 | DISCHARGE
Start: 2021-10-27

## 2021-10-27 RX ADMIN — PANTOPRAZOLE SODIUM 40 MILLIGRAM(S): 20 TABLET, DELAYED RELEASE ORAL at 05:33

## 2021-10-27 RX ADMIN — Medication 40 MILLIGRAM(S): at 05:33

## 2021-10-27 RX ADMIN — METHOCARBAMOL 750 MILLIGRAM(S): 500 TABLET, FILM COATED ORAL at 11:37

## 2021-10-27 RX ADMIN — METHOCARBAMOL 750 MILLIGRAM(S): 500 TABLET, FILM COATED ORAL at 05:33

## 2021-10-27 RX ADMIN — CARBIDOPA AND LEVODOPA 1 TABLET(S): 25; 100 TABLET ORAL at 11:36

## 2021-10-27 RX ADMIN — HEPARIN SODIUM 5000 UNIT(S): 5000 INJECTION INTRAVENOUS; SUBCUTANEOUS at 05:33

## 2021-10-27 RX ADMIN — Medication 1 APPLICATION(S): at 05:33

## 2021-10-27 RX ADMIN — POLYETHYLENE GLYCOL 3350 17 GRAM(S): 17 POWDER, FOR SOLUTION ORAL at 11:37

## 2021-10-27 NOTE — DISCHARGE NOTE PROVIDER - HOSPITAL COURSE
This is a 70 year old male, presents with history of disc herniations and bulging discs.  He states he has had epidural injections with only mild relief. Pt underwent L3-L5 Decompressive laminectomy with Coflex L4-5 on 10.22.21.   Postop pt got physical therapy, using a rolling walker. Pt had issue with constipation, resolved with laxatives.  Pt removed GUILLERMO drain. Some mild drainage from wound.  Home care set up for wound care.  Pt started on Sinimet for tremors. Will follow up as outpatient with Dr Sanchez for further recommendations.

## 2021-10-27 NOTE — DISCHARGE NOTE PROVIDER - NSDCFUADDINST_GEN_ALL_CORE_FT
- Upon discharge,  please call to schedule a follow up with Dr. Sanchez in 1-2 weeks.  - Upon discharge, please call to make a follow up appointment with your primary care provider to discuss your recent hospitalization/operation.  - Keep dressings dry for 48 hours after which you may remove dressing and cleanse with soap and water in the shower (no scrubbing). Leave the steri strips (white tape) on your incisions, they will fall off on their own.  Do not pick at steri strips. Run water and soap over incision sites and pat dry (no scrubbing). No submerging your incision sites in water (i.e. no swimming or baths) for 2-3 weeks and avoid exposing the area to jets/streams of water.   - You can resume your normal activities as tolerated, but avoid heavy (>15lb.) lifting and strenuous exercise for 4-6 weeks.    - You had constipation in the hospital, we recommend taking a stool softener and/or gentle laxative to avoid this problem.  If stools become loose, discontinue stool softener/laxative.    - You should resume all home medications unless otherwise instructed.    - If you experience fevers, chills, increasing abdominal pain, nausea, vomiting, inability to pass stool or gas, bleeding, or any other acute symptoms, please call your doctor and report to the emergency room immediately for further management.  - You were started on Sinimet for tremors.  Follow up with the neurosurgeon.

## 2021-10-27 NOTE — DISCHARGE NOTE PROVIDER - CARE PROVIDER_API CALL
Miya Sanchez)  Neurosurgery  [address], [address]  [place] [address]  Phone: [phone]  Fax: [phone]  Follow Up Time:

## 2021-10-27 NOTE — PROGRESS NOTE ADULT - SUBJECTIVE AND OBJECTIVE BOX
Subjective: 70yMale with a pmhx of M48.061, 28647, 34976    ^M48.061, 92519, 20093    FH: asthma (Father)    Handoff    MEWS Score    Back pain    Diabetes    S/P shoulder surgery    Lumbar facetectomy    History of surgery    SysAdmin_VstLnk        POD# 4  S/P L3-4 diskectomy with L4-5 coflex stabilization       Pt seen and examined at the bedside.  Pt doing better today.  Able to stand with walker and ambulate to bathroom in front of examiner.  Walked 100ft x 2 with RW,  climbed 4 steps yesterday.    Pt insurance not covering short term rehab.  Pt had BM yesterday.  RW and commode delivered.  Pt for d/c today.   Allergies    No Known Allergies    Intolerances        Vital Signs Last 24 Hrs  T(C): 36.4 (27 Oct 2021 04:51), Max: 36.4 (27 Oct 2021 04:51)  T(F): 97.5 (27 Oct 2021 04:51), Max: 97.5 (27 Oct 2021 04:51)  HR: 61 (27 Oct 2021 04:51) (61 - 93)  BP: 135/60 (27 Oct 2021 04:51) (112/65 - 135/60)  BP(mean): --  RR: 18 (27 Oct 2021 04:51) (18 - 18)  SpO2: --      acetaminophen     Tablet .. 650 milliGRAM(s) Oral every 6 hours PRN  BACItracin   Ointment 1 Application(s) Topical every 12 hours  bisacodyl 5 milliGRAM(s) Oral daily  bisacodyl Suppository 10 milliGRAM(s) Rectal daily  carbidopa/levodopa  10/100 1 Tablet(s) Oral daily  furosemide    Tablet 40 milliGRAM(s) Oral daily  heparin   Injectable 5000 Unit(s) SubCutaneous every 8 hours  methocarbamol 750 milliGRAM(s) Oral every 6 hours  morphine  - Injectable 2 milliGRAM(s) IV Push every 3 hours PRN  oxycodone    5 mG/acetaminophen 325 mG 1 Tablet(s) Oral every 4 hours PRN  oxycodone    5 mG/acetaminophen 325 mG 2 Tablet(s) Oral every 6 hours PRN  pantoprazole    Tablet 40 milliGRAM(s) Oral before breakfast  polyethylene glycol 3350 17 Gram(s) Oral daily  senna 2 Tablet(s) Oral at bedtime          REVIEW OF SYSTEMS    [ x] A ten-point review of systems was otherwise negative except as noted.  [ ] Due to altered mental status/intubation, subjective information were not able to be obtained from the patient. History was obtained, to the extent possible, from review of the chart and collateral sources of information.      Neuro Exam:  AAOX3. Verbal function intact  tongue midline, facial motions symmetric  PERRLA, EOMI  Pronator Drift:   Finger to Nose intact  Motor: MAEx4, 5/5 power in b/l UE and LE  Sensation: intact to touch in all extremities        Wound:      Neuro Exam:  AAOX3. Verbal function intact  follows commands  PERRL  Motor: MAEx4  b/l UE's 5/5   b/l LE's 5/5   DF/PF 5/5  Sensation: intact to LT      Wound: dressing changed.  some drainage from previous GUILLERMO site.  New dressing applied w paper tape.             I&O's Detail    I&O's Summary        Assessment/Plan:    for home care  stable for d/c home  d/w attg

## 2021-10-27 NOTE — DISCHARGE NOTE PROVIDER - NSDCMRMEDTOKEN_GEN_ALL_CORE_FT
aspirin 81 mg oral delayed release tablet: 1 tab(s) orally once a day  carbidopa-levodopa 10 mg-100 mg oral tablet: 1 tab(s) orally once a day   furosemide 40 mg oral tablet: 1 tab(s) orally once a day  Prevagen 50 mcg (2000 intl units) oral capsule: 1 cap(s) orally once a day  senna oral tablet: 2 tab(s) orally once a day (at bedtime)

## 2021-11-05 DIAGNOSIS — M51.16 INTERVERTEBRAL DISC DISORDERS WITH RADICULOPATHY, LUMBAR REGION: ICD-10-CM

## 2021-11-05 DIAGNOSIS — K59.00 CONSTIPATION, UNSPECIFIED: ICD-10-CM

## 2021-11-05 DIAGNOSIS — E11.9 TYPE 2 DIABETES MELLITUS WITHOUT COMPLICATIONS: ICD-10-CM

## 2021-11-05 DIAGNOSIS — Z79.82 LONG TERM (CURRENT) USE OF ASPIRIN: ICD-10-CM

## 2021-11-05 DIAGNOSIS — R25.1 TREMOR, UNSPECIFIED: ICD-10-CM

## 2021-11-05 DIAGNOSIS — M48.061 SPINAL STENOSIS, LUMBAR REGION WITHOUT NEUROGENIC CLAUDICATION: ICD-10-CM

## 2021-11-05 DIAGNOSIS — F17.210 NICOTINE DEPENDENCE, CIGARETTES, UNCOMPLICATED: ICD-10-CM

## 2021-11-05 NOTE — CDI QUERY NOTE - NSCDIOTHERTXTBX_GEN_ALL_CORE_HH
___________________________________________________________________________________________________________________________________    Patient Name: FAN NAVARRETE        MRN: [id number] / [id number]        Age: 70y (1950)         Sex: Male    Date of Admission:  10/22/2021 -  10/27/2021    Documentation:     10/22/2021:  Brief Operative Note: ·  PROCEDURES:  Lumbar facetectomy 22-Oct-2021   One Content: Postoperative Diagnosis: Lumbar Stenosis with Radiculopathy   Operation Performed:  Right L3-L4 Laminforaminectomy and Microdiskectomy, Bilateral L4-L5 Laminectomy    Procedure:  The microscope was then brought in for microdissection and from the right hand side, a right L3-L4 laminoforaminotomy  was performed using a standard drill, Kerrison rongeur, and pituitary rongeur A [place] [facility] was then used to elevate the ligamentum flavum, which was removed with the Kerrison rongeur. The thecal sac was identified and appeared intact.  The thecal sac was then retracted medially, and a disk herniation was encountered and removed with micropituitary. Adequate decompression was confirmed with a [place] [facility].  Attention was then turned to the L4-L5 space where  bilateral laminoforaminotomies were performed using a standard drill followed by Kerrison rongeur and elevation and removal of ligamentum flavum with Kerrison rongeur. Adequate decompression was confirmed with a [place] [facility] by palpating the L5 pedicle medially. Copious irrigation was applied, and hemostasis was maintained with the bipolar cautery and Floseal. The fascia was closed with 0 Vicryl sutures.    10/22/2021 -  10/27/2021:  Neurosurgical Attending:   A/P:   S/P L3-4 diskectomy with L4-5 coflex stabilization                                              Based from your professional judgment and clinical indicators above, please clarify if theL3-L4 Diskectomy with L4-L5  Coflex stabilization can be further specified as:    [] L3-L4 Diskectomy with L4-L5 was accomplished with insertion of Coflex stabilization device  [] Other (please specify)    Thank you.

## 2021-11-08 ENCOUNTER — APPOINTMENT (OUTPATIENT)
Dept: NEUROSURGERY | Facility: CLINIC | Age: 71
End: 2021-11-08
Payer: MEDICARE

## 2021-11-08 VITALS — HEIGHT: 71 IN | WEIGHT: 218 LBS | BODY MASS INDEX: 30.52 KG/M2

## 2021-11-08 PROCEDURE — 99024 POSTOP FOLLOW-UP VISIT: CPT

## 2021-11-09 NOTE — HISTORY OF PRESENT ILLNESS
[FreeTextEntry1] : s/p L3-4, 4-5 laminectomy for stenosis and claudication.  Doing well.  Expected postoperative back pain.\par \par Ambulating improving.\par \par Wound clean dry and intact.\par \par Continue home PT\par Return in 3-4 weeks.  Will start outpatient PT.

## 2021-12-01 ENCOUNTER — APPOINTMENT (OUTPATIENT)
Dept: NEUROSURGERY | Facility: CLINIC | Age: 71
End: 2021-12-01
Payer: MEDICARE

## 2021-12-01 VITALS — HEIGHT: 71 IN | WEIGHT: 218 LBS | BODY MASS INDEX: 30.52 KG/M2

## 2021-12-01 DIAGNOSIS — Z09 ENCOUNTER FOR FOLLOW-UP EXAMINATION AFTER COMPLETED TREATMENT FOR CONDITIONS OTHER THAN MALIGNANT NEOPLASM: ICD-10-CM

## 2021-12-01 PROCEDURE — 99024 POSTOP FOLLOW-UP VISIT: CPT

## 2021-12-01 NOTE — REASON FOR VISIT
[Spouse] : spouse [de-identified] : L3/4 L4/5 Laminectomy with Coflex [de-identified] : 10/22/21

## 2021-12-01 NOTE — HISTORY OF PRESENT ILLNESS
[FreeTextEntry1] : Doing well. \par \par Incision well healed. \par \par Preop claudication has improved. Mild lower back pain. Continued numbness in the legs.

## 2021-12-29 ENCOUNTER — APPOINTMENT (OUTPATIENT)
Dept: NEUROSURGERY | Facility: CLINIC | Age: 71
End: 2021-12-29
Payer: MEDICARE

## 2021-12-29 VITALS — BODY MASS INDEX: 28.63 KG/M2 | WEIGHT: 200 LBS | HEIGHT: 70 IN

## 2021-12-29 PROCEDURE — 99024 POSTOP FOLLOW-UP VISIT: CPT

## 2022-01-03 NOTE — HISTORY OF PRESENT ILLNESS
[FreeTextEntry1] : s/p lumbar decompression and interlaminar stabilization.  He is improving.  However, I believe that a majority of his difficulty in ambulation is secondary to a Parkinson's symptoms. \par \par Sinimet started\par \par Return in 8 weeks

## 2022-01-11 RX ORDER — CARBIDOPA AND LEVODOPA 10; 100 MG/1; MG/1
10-100 TABLET, ORALLY DISINTEGRATING ORAL 3 TIMES DAILY
Qty: 90 | Refills: 0 | Status: DISCONTINUED | COMMUNITY
Start: 2021-12-29 | End: 2022-01-11

## 2022-02-07 ENCOUNTER — OUTPATIENT (OUTPATIENT)
Dept: OUTPATIENT SERVICES | Facility: HOSPITAL | Age: 72
LOS: 1 days | Discharge: HOME | End: 2022-02-07

## 2022-02-07 DIAGNOSIS — Z09 ENCOUNTER FOR FOLLOW-UP EXAMINATION AFTER COMPLETED TREATMENT FOR CONDITIONS OTHER THAN MALIGNANT NEOPLASM: ICD-10-CM

## 2022-02-07 DIAGNOSIS — Z98.890 OTHER SPECIFIED POSTPROCEDURAL STATES: Chronic | ICD-10-CM

## 2022-02-07 DIAGNOSIS — M47.816 SPONDYLOSIS WITHOUT MYELOPATHY OR RADICULOPATHY, LUMBAR REGION: ICD-10-CM

## 2022-02-16 ENCOUNTER — APPOINTMENT (OUTPATIENT)
Dept: NEUROSURGERY | Facility: CLINIC | Age: 72
End: 2022-02-16
Payer: MEDICARE

## 2022-02-16 VITALS — HEIGHT: 70 IN | WEIGHT: 200 LBS | BODY MASS INDEX: 28.63 KG/M2

## 2022-02-16 PROCEDURE — 99212 OFFICE O/P EST SF 10 MIN: CPT

## 2022-02-16 RX ORDER — CARBIDOPA AND LEVODOPA 25; 250 MG/1; MG/1
TABLET ORAL
Refills: 0 | Status: ACTIVE | COMMUNITY

## 2022-02-16 RX ORDER — OXYCODONE AND ACETAMINOPHEN 5; 325 MG/1; MG/1
5-325 TABLET ORAL
Qty: 42 | Refills: 0 | Status: DISCONTINUED | COMMUNITY
Start: 2021-10-19 | End: 2022-02-16

## 2022-02-16 RX ORDER — DOCUSATE SODIUM 100 MG/1
100 CAPSULE ORAL
Qty: 14 | Refills: 0 | Status: DISCONTINUED | COMMUNITY
Start: 2021-10-19 | End: 2022-02-16

## 2022-02-16 RX ORDER — CHLORZOXAZONE 500 MG/1
500 TABLET ORAL EVERY 8 HOURS
Qty: 21 | Refills: 0 | Status: DISCONTINUED | COMMUNITY
Start: 2021-10-19 | End: 2022-02-16

## 2022-02-16 NOTE — ASSESSMENT
[FreeTextEntry1] : Mr. NAVARRETE will proceed with PT as recommended by Dr. Wall. He will follow up with her for his Parkinsonism and remain on his current medication regimen. He may follow up as needed.

## 2022-02-16 NOTE — HISTORY OF PRESENT ILLNESS
[FreeTextEntry1] : Mr. NAVARRETE is s/p status post L3/4 L4/5 Laminectomy with interlaminar stabilization. Surgery Date: 10/22/21. Since his last visit, he consulted with a neurologist at [facility], Dr. Laila Wall. He was formally diagnosed with Parkinsonism and was started on Carbidopa-Levadopa  mg which has provided him with improvement of his gait after only a few days. He is very pleased with his response to this medication. PT has been recommended by Dr. Wall for gait training / balance. He denies pain today. His gait is more steady / fluid today compared to last visit. He is walking without assistance. He is able to get up from a seated position with less difficulty. \par

## 2022-03-12 NOTE — DISCHARGE NOTE NURSING/CASE MANAGEMENT/SOCIAL WORK - NSDCPETBCESMAN_GEN_ALL_CORE
When taking your blood pressure please ensure you have been sitting for at least 5 minutes with your feet shoulder width apart.  Please have your arm elevated at heart level and make sure your cuff is the appropriate size as a smaller cuff will elevate your pressure.  Please make sure you are taking your blood pressure at least 1 to 2 hours after taking medication    
If you are a smoker, it is important for your health to stop smoking. Please be aware that second hand smoke is also harmful.

## 2022-04-07 ENCOUNTER — OUTPATIENT (OUTPATIENT)
Dept: OUTPATIENT SERVICES | Facility: HOSPITAL | Age: 72
LOS: 1 days | Discharge: HOME | End: 2022-04-07

## 2022-04-07 DIAGNOSIS — Z98.890 OTHER SPECIFIED POSTPROCEDURAL STATES: Chronic | ICD-10-CM

## 2022-04-07 DIAGNOSIS — G20 PARKINSON'S DISEASE: ICD-10-CM

## 2022-04-13 DIAGNOSIS — S22.39XA FRACTURE OF ONE RIB, UNSPECIFIED SIDE, INITIAL ENCOUNTER FOR CLOSED FRACTURE: ICD-10-CM

## 2022-04-18 ENCOUNTER — APPOINTMENT (OUTPATIENT)
Dept: NEUROSURGERY | Facility: CLINIC | Age: 72
End: 2022-04-18
Payer: MEDICARE

## 2022-04-18 PROCEDURE — 99213 OFFICE O/P EST LOW 20 MIN: CPT

## 2022-04-18 RX ORDER — DICLOFENAC SODIUM 1% 10 MG/G
1 GEL TOPICAL
Qty: 1 | Refills: 0 | Status: ACTIVE | COMMUNITY
Start: 2022-04-18 | End: 1900-01-01

## 2022-04-18 NOTE — REVIEW OF SYSTEMS
[As Noted in HPI] : as noted in HPI [Joint Pain] : joint pain [Negative] : Psychiatric [Fever] : no fever [Chills] : no chills [Eye Pain] : no eye pain [Red Eyes] : eyes not red [Earache] : no earache [Loss Of Hearing] : no hearing loss [Chest Pain] : no chest pain [Palpitations] : no palpitations [Shortness Of Breath] : no shortness of breath [Wheezing] : no wheezing [Vomiting] : no vomiting [Incontinence] : no incontinence [FreeTextEntry9] : LEFT elbow, secondary to traumatic fall

## 2022-04-18 NOTE — HISTORY OF PRESENT ILLNESS
[FreeTextEntry1] : Mr Kwok is 6 months s/p L3-4 and L4-5 laminectomy with posterior stabilization (CoFlex). He comes today for a review of X-rays as well as complaints of mid back pain following a fall a few weeks ago. Per patient and his wife, he was pulling leaves from a tree in his yard when the branch broke and he fell back and onto the LEFT elbow. Since that time has been experiencing pain in the mid back with radiating pain around the front of his rib cage.  \par \par Has been doing PT, but this seems to be aggravating he pain a little and he finds it difficult to find a comfortable position. Describes it as an annoyance. Denies any leg pain, numbness, tingling or weakness. Saini snot endorse any bowel or bladder incontinence at this time. \par \par Would also like to have a refill on his Carbidopa/Levodopa medication for his Parkinson's.\par \par IMAGING: [facility] XR LS with flexion/extension views dated 4/14/2022 demonstrates intact interspinous device at L4-5. There is a mild degenerative anterolisthesis at this same level which is unchanged on flexion-extension. Of note there is a T12 superior endplate fracture of T12.\par \par XR Thoracic spine dated 4/14/2022 demonstrates endplate fractures of T9, T11 and T12.\par \par PE:\par Constitutional: Well appearing, no distress sitting on the chair in mild discomfort\par HEENT: Normocephalic Atraumatic, sclerae anicteric, mucus membranes moist, trachea midline\par Psychiatric: Alert and oriented to all spheres, normal mood\par Pulmonary: No respiratory distress or accessory muscle use\par \par Neurologic:\par CN II-XII grossly intact \par Palpation: Mild tenderness to palpation of LEFT elbow with mild edema and healing laceration. Mild midline tenderness to palpation of thoracic spine around T12.\par Strength: Full strength in all major muscle groups, no atrophy\par Sensation: Full sensation to light touch in all extremities\par \par Reflexes:\par 2+ patellar\par 2+ biceps\par 2+ ankle jerk\par No Ellis's\par No clonus\par No babinski\par \par ROM intact through all planes with passive movement\par \par Gait: WNL with minimal wide stance\par \par

## 2022-05-16 ENCOUNTER — APPOINTMENT (OUTPATIENT)
Dept: NEUROSURGERY | Facility: CLINIC | Age: 72
End: 2022-05-16
Payer: MEDICARE

## 2022-05-16 DIAGNOSIS — G20 PARKINSON'S DISEASE: ICD-10-CM

## 2022-05-16 DIAGNOSIS — S22.000A WEDGE COMPRESSION FRACTURE OF UNSPECIFIED THORACIC VERTEBRA, INITIAL ENCOUNTER FOR CLOSED FRACTURE: ICD-10-CM

## 2022-05-16 PROCEDURE — 99213 OFFICE O/P EST LOW 20 MIN: CPT

## 2022-05-16 NOTE — REVIEW OF SYSTEMS
[As Noted in HPI] : as noted in HPI [Negative] : Psychiatric [Fever] : no fever [Chills] : no chills [Eye Pain] : no eye pain [Red Eyes] : eyes not red [Loss Of Hearing] : no hearing loss [Chest Pain] : no chest pain [Palpitations] : no palpitations [Shortness Of Breath] : no shortness of breath [Wheezing] : no wheezing [Vomiting] : no vomiting [Incontinence] : no incontinence

## 2022-05-31 ENCOUNTER — APPOINTMENT (OUTPATIENT)
Dept: NEUROLOGY | Facility: CLINIC | Age: 72
End: 2022-05-31

## 2022-07-14 ENCOUNTER — APPOINTMENT (OUTPATIENT)
Dept: PAIN MANAGEMENT | Facility: CLINIC | Age: 72
End: 2022-07-14

## 2022-07-14 VITALS — HEIGHT: 70 IN | WEIGHT: 200 LBS | BODY MASS INDEX: 28.63 KG/M2

## 2022-07-14 VITALS — HEART RATE: 94 BPM | SYSTOLIC BLOOD PRESSURE: 127 MMHG | DIASTOLIC BLOOD PRESSURE: 82 MMHG

## 2022-07-14 DIAGNOSIS — S22.000S WEDGE COMPRESSION FRACTURE OF UNSPECIFIED THORACIC VERTEBRA, SEQUELA: ICD-10-CM

## 2022-07-14 DIAGNOSIS — M47.816 SPONDYLOSIS W/OUT MYELOPATHY OR RADICULOPATHY, LUMBAR REGION: ICD-10-CM

## 2022-07-14 PROCEDURE — 99213 OFFICE O/P EST LOW 20 MIN: CPT

## 2022-07-14 RX ORDER — MELOXICAM 15 MG/1
15 TABLET ORAL DAILY
Qty: 14 | Refills: 0 | Status: ACTIVE | COMMUNITY
Start: 2022-07-14 | End: 1900-01-01

## 2022-07-14 NOTE — DATA REVIEWED
[FreeTextEntry1] :  x-ray of the chest April 14, 2022:  unremarkable\par \par  x-ray of the bilateral ribs April 14, 2022: unremarkable\par \par  x-ray of the lumbar spine April 14, 2022: Mild degenerative anterior spondylolisthesis at L4-L5 unchanged on flexion and extension positioning.  Scanned interspinous spacer is pain between the spinous process of L4 and L5.  Disc space narrowing a small dorsal osteophyte is present at L3-L4.  Disc space narrowing is also evident at T12-L1 and T11-T12 the compression fracture of the superior vertebral plate of T12.\par \par   X-ray of the thoracic spine April 14, 2022: Compression fractures of the superior vertebral plates of T12, T11, and T9 is noted. Disc space narrowing is seen from T6 through T10. Prominent osteophytic ridges are observed from T9 through T12.

## 2022-07-14 NOTE — REASON FOR VISIT
[FreeTextEntry2] : pATIENT PRESENTS TO OFFICE TODAY FOR A FOLLOW UP ON BACK PAIN AS WELL AS XRAY RESULTS

## 2022-07-14 NOTE — ASSESSMENT
[FreeTextEntry1] : 71-year-old male presents to the walk-in April 2022 with mid thoracic pain.  X-rays of the thoracic spine did show multiple compression fractures.  he is currently attending physical therapy twice a week which has been gradually improving his pain.  At this time, he was advised to continue with conservative treatment.  We will send over two week supply of meloxicam to be taken as needed for pain.  He will follow up with us in three months for reassessment.

## 2022-07-14 NOTE — HISTORY OF PRESENT ILLNESS
[FreeTextEntry1] : Mr. Boni Kwok, a 71-year-old male, presents today after he sustained a fall approximately 1 week ago. He states he has a prior history of surgical correction to the spine at [facility]. He states he fell on his side injuring his left elbow. He also has nonspecific thoracic, lumbar and bilateral rib pain. He denies any radiating symptoms. He denies any numbness and tingling. He states pain does not travel into the lower extremities. He states him and his family are concerned about a possible fracture. He states he did speak with his surgeon and possible follow-up is planned.\par \par \par TODAY: he presents today for revisit encounter.  He was last seen in April 2022.  Since his last visit, he underwent multiple x-rays  including his thoracic and lumbar spine, chest and bilateral ribs.  He did follow-up with his surgeon afterwards.  He was found to have  multiple  compression fractures in the thoracic spine.   He was advised by the surgeon to start physical therapy for which he has been attending twice a week. today, he states the pain moderately improved since his last visit.  Pain is quite tolerable.  Only occasionally, he will take an ibuprofen however finds minimal relief with it.

## 2022-07-21 ENCOUNTER — APPOINTMENT (OUTPATIENT)
Dept: NEUROLOGY | Facility: CLINIC | Age: 72
End: 2022-07-21

## 2022-08-09 NOTE — ASSESSMENT
[FreeTextEntry1] : Mr. NAVARRETE will start outpatient PT. \par \par He will follow up in 4-6 weeks. \par \par All questions answered today.\par  done

## 2022-09-12 ENCOUNTER — OUTPATIENT (OUTPATIENT)
Dept: OUTPATIENT SERVICES | Facility: HOSPITAL | Age: 72
LOS: 1 days | Discharge: HOME | End: 2022-09-12

## 2022-09-12 DIAGNOSIS — R07.9 CHEST PAIN, UNSPECIFIED: ICD-10-CM

## 2022-09-12 DIAGNOSIS — Z98.890 OTHER SPECIFIED POSTPROCEDURAL STATES: Chronic | ICD-10-CM

## 2022-09-12 PROCEDURE — 71046 X-RAY EXAM CHEST 2 VIEWS: CPT | Mod: 26

## 2022-09-27 ENCOUNTER — OUTPATIENT (OUTPATIENT)
Dept: OUTPATIENT SERVICES | Facility: HOSPITAL | Age: 72
LOS: 1 days | Discharge: HOME | End: 2022-09-27

## 2022-09-27 DIAGNOSIS — S22.000A WEDGE COMPRESSION FRACTURE OF UNSPECIFIED THORACIC VERTEBRA, INITIAL ENCOUNTER FOR CLOSED FRACTURE: ICD-10-CM

## 2022-09-27 DIAGNOSIS — Z98.890 OTHER SPECIFIED POSTPROCEDURAL STATES: Chronic | ICD-10-CM

## 2022-10-03 ENCOUNTER — OUTPATIENT (OUTPATIENT)
Dept: OUTPATIENT SERVICES | Facility: HOSPITAL | Age: 72
LOS: 1 days | Discharge: HOME | End: 2022-10-03

## 2022-10-03 DIAGNOSIS — Z98.890 OTHER SPECIFIED POSTPROCEDURAL STATES: Chronic | ICD-10-CM

## 2022-10-03 DIAGNOSIS — R10.9 UNSPECIFIED ABDOMINAL PAIN: ICD-10-CM

## 2022-10-03 PROCEDURE — 76700 US EXAM ABDOM COMPLETE: CPT | Mod: 26

## 2022-10-25 ENCOUNTER — APPOINTMENT (OUTPATIENT)
Dept: PAIN MANAGEMENT | Facility: CLINIC | Age: 72
End: 2022-10-25

## 2022-10-27 DIAGNOSIS — S22.000D WEDGE COMPRESSION FRACTURE OF UNSPECIFIED THORACIC VERTEBRA, SUBSEQUENT ENCOUNTER FOR FRACTURE WITH ROUTINE HEALING: ICD-10-CM

## 2024-03-11 ENCOUNTER — APPOINTMENT (OUTPATIENT)
Dept: NEUROSURGERY | Facility: CLINIC | Age: 74
End: 2024-03-11
Payer: MEDICARE

## 2024-03-11 VITALS — BODY MASS INDEX: 32.93 KG/M2 | WEIGHT: 230 LBS | HEIGHT: 70 IN

## 2024-03-11 DIAGNOSIS — M48.061 SPINAL STENOSIS, LUMBAR REGION WITHOUT NEUROGENIC CLAUDICATION: ICD-10-CM

## 2024-03-11 PROCEDURE — 99212 OFFICE O/P EST SF 10 MIN: CPT

## 2024-03-11 NOTE — HISTORY OF PRESENT ILLNESS
[FreeTextEntry1] : This is a 74 yo M with a past surgical history of L3-5 laminectomy with Coflex which was completed in October 2021 by Dr. Sanchez.  Postoperatively, he had appreciated a significant improvement with regards to his low back pain as well as his lower extremity heaviness and radicular complaints.  Over the previous year he has begun to experience an increase in low back pain with progressive lower extremity sharp, shooting, numbness, tingling as well as heaviness into the legs.  He also has an underlying history of Parkinson's disease which adds to his muscle rigidity and stiffness.  He notes difficulty performing light household chores and running local errands.  He attempted at home exercises which failed to provide relief therefore he returned to the office to assess the need for additional imaging/testing.  PHYSICAL EXAM:   Constitutional: Well appearing, no distress. Flat/mask-like face HEENT: Normocephalic Atraumatic Psychiatric: Alert and oriented to all spheres, normal mood Pulmonary: No respiratory distress  Neurologic:  CN II-XII grossly intact Palpation: (+) lumbar paravertebral tenderness Strength: 5-/5 throughout all muscle groups tested. Sensation: Full sensation to light touch in all extremities Reflexes:   2+ patellar  2+ ankle jerk  ROM limited in all motions  Signs: SLR negative  Gait: slow/ataxic gait.

## 2024-03-11 NOTE — ASSESSMENT
[FreeTextEntry1] : 73-year-old male well-known to our office for prior history of L3-5 laminectomy with Coflex which was completed in late 2021.  He had responded quite well postoperatively but notes progressive lower extremity radicular complaints over the previous 6 to 12 months.  An updated MRI L-spine without contrast warranted for my review to assess for an interval progression of stenotic disease at adjacent segments.  He is to continue with at home exercises such as stretching and ambulation as tolerated.  I will have him return to the office in 4 weeks once imaging has been completed to review and to establish a continued neurosurgical treatment plan moving forward.  I have briefly discussed the indication for pain management efforts but he remains averse to such consideration.  I personally reviewed with the PA, this patient's history and physical exam findings, as documented above. I have discussed the relevant areas of concern, having direct implications to the presenting problems and illnesses, and I have personally examined all pertinent and positive and negative findings, which impact the neurosurgical treatment plan.  BETTY Kirkland MD

## 2024-04-23 ENCOUNTER — OUTPATIENT (OUTPATIENT)
Dept: OUTPATIENT SERVICES | Facility: HOSPITAL | Age: 74
LOS: 1 days | End: 2024-04-23
Payer: MEDICARE

## 2024-04-23 ENCOUNTER — RESULT REVIEW (OUTPATIENT)
Age: 74
End: 2024-04-23

## 2024-04-23 DIAGNOSIS — Z00.8 ENCOUNTER FOR OTHER GENERAL EXAMINATION: ICD-10-CM

## 2024-04-23 DIAGNOSIS — Z98.890 OTHER SPECIFIED POSTPROCEDURAL STATES: Chronic | ICD-10-CM

## 2024-04-23 DIAGNOSIS — M48.061 SPINAL STENOSIS, LUMBAR REGION WITHOUT NEUROGENIC CLAUDICATION: ICD-10-CM

## 2024-04-23 PROCEDURE — 72148 MRI LUMBAR SPINE W/O DYE: CPT | Mod: 26

## 2024-04-23 PROCEDURE — 72148 MRI LUMBAR SPINE W/O DYE: CPT

## 2024-04-24 DIAGNOSIS — M48.061 SPINAL STENOSIS, LUMBAR REGION WITHOUT NEUROGENIC CLAUDICATION: ICD-10-CM

## 2024-04-29 ENCOUNTER — APPOINTMENT (OUTPATIENT)
Dept: NEUROSURGERY | Facility: CLINIC | Age: 74
End: 2024-04-29
Payer: MEDICARE

## 2024-04-29 VITALS — HEIGHT: 70 IN | WEIGHT: 230 LBS | BODY MASS INDEX: 32.93 KG/M2

## 2024-04-29 DIAGNOSIS — M47.816 SPONDYLOSIS W/OUT MYELOPATHY OR RADICULOPATHY, LUMBAR REGION: ICD-10-CM

## 2024-04-29 DIAGNOSIS — Z78.9 OTHER SPECIFIED HEALTH STATUS: ICD-10-CM

## 2024-04-29 DIAGNOSIS — M48.061 SPINAL STENOSIS, LUMBAR REGION WITHOUT NEUROGENIC CLAUDICATION: ICD-10-CM

## 2024-04-29 PROCEDURE — 99212 OFFICE O/P EST SF 10 MIN: CPT

## 2024-04-29 NOTE — HISTORY OF PRESENT ILLNESS
[FreeTextEntry1] : Mr. NAVARRETE has a past surgical history of an L3-L5 laminectomy with interlaminar stabilization at L4/5 (coflex). Surgery was performed in October 2021.  Postoperatively he did well with improvement of preoperative symptoms.  Unfortunately, 6 weeks ago he presented for reevaluation due to an increase in lower back pain with a sensation of heaviness in the lower extremities.  Of note he also has Parkinson's which adds to his muscular rigidity and stiffness.  No recent conservative management.  He has been attempting to performed household chores despite the pain.  Today we have reviewed an updated MRI of the lumbar spine from  4/23/24.  Lumbar spondylosis with postoperative changes noted at L3-4 and L4-5 status post prior L3-4 L4-5 laminectomy with interlaminar stabilization at L4-5.  There has been a slight increase in degenerative changes/stenosis at L3-4.

## 2024-04-29 NOTE — ASSESSMENT
[FreeTextEntry1] : We have had a thorough discussion regarding his current condition, findings, and treatment options. Mr. NAVARRETE will continue with conservative management.  We are not considering additional surgical intervention at this point in time.  He will proceed with physical therapy.  He will also speak to pain management regarding injection treatments as well.  I will see him back in 3 months.   I personally reviewed with the PA, this patient's history and physical exam findings, as documented above. I have discussed the relevant areas of concern, having direct implications to the presenting problems and illnesses, and I have personally examined all pertinent and positive and negative findings, which impact their neurosurgical treatment.  MS KIKO RabagoC Senior Physician Assistant [facility]    Miya Sanchez MD FAANS Chair, Department of Neurosurgery [facility]

## 2024-04-29 NOTE — PHYSICAL EXAM
[FreeTextEntry1] : Constitutional: Well appearing, no distress. Flat/mask-like face HEENT: Normocephalic Atraumatic Psychiatric: Alert and oriented to all spheres, normal mood Pulmonary: No respiratory distress Neurologic: CN II-XII grossly intact Palpation: (+) lumbar paravertebral tenderness Strength: 5-/5 throughout all muscle groups tested. Sensation: Full sensation to light touch in all extremities Reflexes: 2+ patellar 2+ ankle jerk ROM limited in all motions Signs: SLR negative Gait: slow/steady gait.  Ambulating without assistance.

## 2024-06-05 RX ORDER — LIDOCAINE 4 G/100G
4 PATCH TOPICAL
Qty: 1 | Refills: 0 | Status: ACTIVE | COMMUNITY
Start: 2024-06-05 | End: 1900-01-01

## 2024-07-24 ENCOUNTER — APPOINTMENT (OUTPATIENT)
Dept: NEUROSURGERY | Facility: CLINIC | Age: 74
End: 2024-07-24

## 2024-07-25 NOTE — ASU PATIENT PROFILE, ADULT - PAIN, CHRONIC: FACTORS THAT AGGRAVATE, PROFILE
----- Message from Ronaldo Roper MD sent at 7/24/2024  4:57 PM CDT -----  Flagyl 500 mg PO BID x 7 days  Avoid alcohol use while taking flagyl   Partner may be treated as well  NED In 1 month   activity

## 2025-01-14 ENCOUNTER — APPOINTMENT (OUTPATIENT)
Dept: UROLOGY | Facility: CLINIC | Age: 75
End: 2025-01-14

## 2025-01-14 VITALS
SYSTOLIC BLOOD PRESSURE: 131 MMHG | WEIGHT: 211 LBS | HEIGHT: 71 IN | OXYGEN SATURATION: 94 % | DIASTOLIC BLOOD PRESSURE: 84 MMHG | BODY MASS INDEX: 29.54 KG/M2 | HEART RATE: 75 BPM | TEMPERATURE: 97.5 F | RESPIRATION RATE: 17 BRPM

## 2025-01-14 DIAGNOSIS — M48.061 SPINAL STENOSIS, LUMBAR REGION WITHOUT NEUROGENIC CLAUDICATION: ICD-10-CM

## 2025-01-14 DIAGNOSIS — G20.A1 PARKINSON'S DISEASE WITHOUT DYSKINESIA, WITHOUT MENTION OF FLUCTUATIONS: ICD-10-CM

## 2025-01-14 DIAGNOSIS — R97.20 ELEVATED PROSTATE, SPECIFIC ANTIGEN [PSA]: ICD-10-CM

## 2025-01-14 DIAGNOSIS — N52.9 MALE ERECTILE DYSFUNCTION, UNSPECIFIED: ICD-10-CM

## 2025-01-14 PROCEDURE — G2211 COMPLEX E/M VISIT ADD ON: CPT

## 2025-01-14 PROCEDURE — 81003 URINALYSIS AUTO W/O SCOPE: CPT | Mod: QW

## 2025-01-14 PROCEDURE — 99204 OFFICE O/P NEW MOD 45 MIN: CPT | Mod: 25

## 2025-01-14 RX ORDER — RIVASTIGMINE TARTRATE 6 MG/1
CAPSULE ORAL
Refills: 0 | Status: ACTIVE | COMMUNITY

## 2025-01-14 RX ORDER — CARBIDOPA AND LEVODOPA 25; 250 MG/1; MG/1
25-250 TABLET ORAL
Refills: 0 | Status: ACTIVE | COMMUNITY

## 2025-01-14 RX ORDER — LINACLOTIDE 290 UG/1
290 CAPSULE, GELATIN COATED ORAL
Refills: 0 | Status: ACTIVE | COMMUNITY

## 2025-01-14 RX ORDER — FLUTICASONE FUROATE, UMECLIDINIUM BROMIDE AND VILANTEROL TRIFENATATE 100; 62.5; 25 UG/1; UG/1; UG/1
100-62.5-25 POWDER RESPIRATORY (INHALATION)
Refills: 0 | Status: ACTIVE | COMMUNITY

## 2025-01-14 RX ORDER — OPICAPONE 50 MG/1
50 CAPSULE ORAL
Refills: 0 | Status: ACTIVE | COMMUNITY

## 2025-01-14 RX ORDER — RASAGILINE 1 MG/1
1 TABLET ORAL
Refills: 0 | Status: ACTIVE | COMMUNITY

## 2025-01-14 RX ORDER — TERBINAFINE HYDROCHLORIDE 250 MG/1
250 TABLET ORAL
Refills: 0 | Status: ACTIVE | COMMUNITY

## 2025-01-14 RX ORDER — LOSARTAN POTASSIUM 25 MG/1
25 TABLET, FILM COATED ORAL
Refills: 0 | Status: ACTIVE | COMMUNITY

## 2025-01-23 LAB
BILIRUB UR QL STRIP: NORMAL
CLARITY UR: CLEAR
COLLECTION METHOD: NORMAL
GLUCOSE UR-MCNC: NORMAL
HCG UR QL: 1 EU/DL
HGB UR QL STRIP.AUTO: NORMAL
KETONES UR-MCNC: NORMAL
LEUKOCYTE ESTERASE UR QL STRIP: NORMAL
NITRITE UR QL STRIP: NORMAL
PH UR STRIP: 5.5
PROT UR STRIP-MCNC: NORMAL
SP GR UR STRIP: 1.03

## 2025-01-27 ENCOUNTER — RESULT REVIEW (OUTPATIENT)
Age: 75
End: 2025-01-27

## 2025-01-27 ENCOUNTER — OUTPATIENT (OUTPATIENT)
Dept: OUTPATIENT SERVICES | Facility: HOSPITAL | Age: 75
LOS: 1 days | End: 2025-01-27
Payer: MEDICARE

## 2025-01-27 DIAGNOSIS — Z98.890 OTHER SPECIFIED POSTPROCEDURAL STATES: Chronic | ICD-10-CM

## 2025-01-27 DIAGNOSIS — R97.20 ELEVATED PROSTATE SPECIFIC ANTIGEN [PSA]: ICD-10-CM

## 2025-01-27 DIAGNOSIS — Z00.8 ENCOUNTER FOR OTHER GENERAL EXAMINATION: ICD-10-CM

## 2025-01-27 PROCEDURE — 72197 MRI PELVIS W/O & W/DYE: CPT | Mod: 26

## 2025-01-27 PROCEDURE — A9579: CPT

## 2025-01-27 PROCEDURE — 72197 MRI PELVIS W/O & W/DYE: CPT

## 2025-01-28 DIAGNOSIS — R97.20 ELEVATED PROSTATE SPECIFIC ANTIGEN [PSA]: ICD-10-CM

## 2025-01-31 ENCOUNTER — APPOINTMENT (OUTPATIENT)
Dept: UROLOGY | Facility: CLINIC | Age: 75
End: 2025-01-31
Payer: MEDICARE

## 2025-01-31 VITALS
DIASTOLIC BLOOD PRESSURE: 82 MMHG | HEIGHT: 71 IN | BODY MASS INDEX: 29.54 KG/M2 | WEIGHT: 211 LBS | SYSTOLIC BLOOD PRESSURE: 123 MMHG | HEART RATE: 90 BPM

## 2025-01-31 DIAGNOSIS — R97.20 ELEVATED PROSTATE, SPECIFIC ANTIGEN [PSA]: ICD-10-CM

## 2025-01-31 DIAGNOSIS — M48.061 SPINAL STENOSIS, LUMBAR REGION WITHOUT NEUROGENIC CLAUDICATION: ICD-10-CM

## 2025-01-31 PROCEDURE — 99214 OFFICE O/P EST MOD 30 MIN: CPT | Mod: 25

## 2025-01-31 PROCEDURE — 81003 URINALYSIS AUTO W/O SCOPE: CPT | Mod: QW

## 2025-02-04 LAB
BILIRUB UR QL STRIP: NORMAL
COLLECTION METHOD: NORMAL
GLUCOSE UR-MCNC: NORMAL
HCG UR QL: NORMAL EU/DL
HGB UR QL STRIP.AUTO: NORMAL
KETONES UR-MCNC: NORMAL
LEUKOCYTE ESTERASE UR QL STRIP: NORMAL
NITRITE UR QL STRIP: NORMAL
PH UR STRIP: 6.5
PROT UR STRIP-MCNC: NORMAL
SP GR UR STRIP: 1.02

## 2025-02-26 ENCOUNTER — OUTPATIENT (OUTPATIENT)
Dept: OUTPATIENT SERVICES | Facility: HOSPITAL | Age: 75
LOS: 1 days | End: 2025-02-26
Payer: MEDICARE

## 2025-02-26 VITALS
TEMPERATURE: 98 F | RESPIRATION RATE: 18 BRPM | SYSTOLIC BLOOD PRESSURE: 122 MMHG | HEIGHT: 71 IN | OXYGEN SATURATION: 100 % | DIASTOLIC BLOOD PRESSURE: 77 MMHG | WEIGHT: 209.44 LBS | HEART RATE: 84 BPM

## 2025-02-26 DIAGNOSIS — Z98.890 OTHER SPECIFIED POSTPROCEDURAL STATES: Chronic | ICD-10-CM

## 2025-02-26 DIAGNOSIS — Z01.818 ENCOUNTER FOR OTHER PREPROCEDURAL EXAMINATION: ICD-10-CM

## 2025-02-26 DIAGNOSIS — R97.20 ELEVATED PROSTATE SPECIFIC ANTIGEN [PSA]: ICD-10-CM

## 2025-02-26 LAB
ALBUMIN SERPL ELPH-MCNC: 4.3 G/DL — SIGNIFICANT CHANGE UP (ref 3.5–5.2)
ALP SERPL-CCNC: 89 U/L — SIGNIFICANT CHANGE UP (ref 30–115)
ALT FLD-CCNC: 6 U/L — SIGNIFICANT CHANGE UP (ref 0–41)
ANION GAP SERPL CALC-SCNC: 13 MMOL/L — SIGNIFICANT CHANGE UP (ref 7–14)
APPEARANCE UR: CLEAR — SIGNIFICANT CHANGE UP
AST SERPL-CCNC: 15 U/L — SIGNIFICANT CHANGE UP (ref 0–41)
BASOPHILS # BLD AUTO: 0.03 K/UL — SIGNIFICANT CHANGE UP (ref 0–0.2)
BASOPHILS NFR BLD AUTO: 0.5 % — SIGNIFICANT CHANGE UP (ref 0–1)
BILIRUB SERPL-MCNC: 2 MG/DL — HIGH (ref 0.2–1.2)
BILIRUB UR-MCNC: NEGATIVE — SIGNIFICANT CHANGE UP
BUN SERPL-MCNC: 12 MG/DL — SIGNIFICANT CHANGE UP (ref 10–20)
CALCIUM SERPL-MCNC: 9.7 MG/DL — SIGNIFICANT CHANGE UP (ref 8.4–10.5)
CHLORIDE SERPL-SCNC: 102 MMOL/L — SIGNIFICANT CHANGE UP (ref 98–110)
CO2 SERPL-SCNC: 25 MMOL/L — SIGNIFICANT CHANGE UP (ref 17–32)
COLOR SPEC: YELLOW — SIGNIFICANT CHANGE UP
CREAT SERPL-MCNC: 0.8 MG/DL — SIGNIFICANT CHANGE UP (ref 0.7–1.5)
DIFF PNL FLD: NEGATIVE — SIGNIFICANT CHANGE UP
EGFR: 93 ML/MIN/1.73M2 — SIGNIFICANT CHANGE UP
EOSINOPHIL # BLD AUTO: 0.15 K/UL — SIGNIFICANT CHANGE UP (ref 0–0.7)
EOSINOPHIL NFR BLD AUTO: 2.7 % — SIGNIFICANT CHANGE UP (ref 0–8)
GLUCOSE SERPL-MCNC: 179 MG/DL — HIGH (ref 70–99)
GLUCOSE UR QL: NEGATIVE MG/DL — SIGNIFICANT CHANGE UP
HCT VFR BLD CALC: 45.6 % — SIGNIFICANT CHANGE UP (ref 42–52)
HGB BLD-MCNC: 15.7 G/DL — SIGNIFICANT CHANGE UP (ref 14–18)
IMM GRANULOCYTES NFR BLD AUTO: 0.5 % — HIGH (ref 0.1–0.3)
KETONES UR-MCNC: NEGATIVE MG/DL — SIGNIFICANT CHANGE UP
LEUKOCYTE ESTERASE UR-ACNC: NEGATIVE — SIGNIFICANT CHANGE UP
LYMPHOCYTES # BLD AUTO: 1.12 K/UL — LOW (ref 1.2–3.4)
LYMPHOCYTES # BLD AUTO: 19.8 % — LOW (ref 20.5–51.1)
MCHC RBC-ENTMCNC: 29.2 PG — SIGNIFICANT CHANGE UP (ref 27–31)
MCHC RBC-ENTMCNC: 34.4 G/DL — SIGNIFICANT CHANGE UP (ref 32–37)
MCV RBC AUTO: 84.9 FL — SIGNIFICANT CHANGE UP (ref 80–94)
MONOCYTES # BLD AUTO: 0.49 K/UL — SIGNIFICANT CHANGE UP (ref 0.1–0.6)
MONOCYTES NFR BLD AUTO: 8.7 % — SIGNIFICANT CHANGE UP (ref 1.7–9.3)
NEUTROPHILS # BLD AUTO: 3.83 K/UL — SIGNIFICANT CHANGE UP (ref 1.4–6.5)
NEUTROPHILS NFR BLD AUTO: 67.8 % — SIGNIFICANT CHANGE UP (ref 42.2–75.2)
NITRITE UR-MCNC: NEGATIVE — SIGNIFICANT CHANGE UP
NRBC BLD AUTO-RTO: 0 /100 WBCS — SIGNIFICANT CHANGE UP (ref 0–0)
PH UR: 6 — SIGNIFICANT CHANGE UP (ref 5–8)
PLATELET # BLD AUTO: 192 K/UL — SIGNIFICANT CHANGE UP (ref 130–400)
PMV BLD: 10.3 FL — SIGNIFICANT CHANGE UP (ref 7.4–10.4)
POTASSIUM SERPL-MCNC: 4.3 MMOL/L — SIGNIFICANT CHANGE UP (ref 3.5–5)
POTASSIUM SERPL-SCNC: 4.3 MMOL/L — SIGNIFICANT CHANGE UP (ref 3.5–5)
PROT SERPL-MCNC: 6.3 G/DL — SIGNIFICANT CHANGE UP (ref 6–8)
PROT UR-MCNC: NEGATIVE MG/DL — SIGNIFICANT CHANGE UP
RBC # BLD: 5.37 M/UL — SIGNIFICANT CHANGE UP (ref 4.7–6.1)
RBC # FLD: 12.9 % — SIGNIFICANT CHANGE UP (ref 11.5–14.5)
SODIUM SERPL-SCNC: 140 MMOL/L — SIGNIFICANT CHANGE UP (ref 135–146)
SP GR SPEC: 1.01 — SIGNIFICANT CHANGE UP (ref 1–1.03)
UROBILINOGEN FLD QL: 0.2 MG/DL — SIGNIFICANT CHANGE UP (ref 0.2–1)
WBC # BLD: 5.65 K/UL — SIGNIFICANT CHANGE UP (ref 4.8–10.8)
WBC # FLD AUTO: 5.65 K/UL — SIGNIFICANT CHANGE UP (ref 4.8–10.8)

## 2025-02-26 PROCEDURE — 36415 COLL VENOUS BLD VENIPUNCTURE: CPT

## 2025-02-26 PROCEDURE — 99214 OFFICE O/P EST MOD 30 MIN: CPT | Mod: 25

## 2025-02-26 PROCEDURE — 80053 COMPREHEN METABOLIC PANEL: CPT

## 2025-02-26 PROCEDURE — 85025 COMPLETE CBC W/AUTO DIFF WBC: CPT

## 2025-02-26 PROCEDURE — 93010 ELECTROCARDIOGRAM REPORT: CPT

## 2025-02-26 PROCEDURE — 87086 URINE CULTURE/COLONY COUNT: CPT

## 2025-02-26 PROCEDURE — 81003 URINALYSIS AUTO W/O SCOPE: CPT

## 2025-02-26 PROCEDURE — 93005 ELECTROCARDIOGRAM TRACING: CPT

## 2025-02-26 NOTE — H&P PST ADULT - HISTORY OF PRESENT ILLNESS
Patient is a 74  year old male presenting to PAST in preparation for   MRI FUSION TRANSPERINEAL PROSTATE BIOPSY on 3/19 under gen anesthesia by Dr. Rowland  Reports h/o elevated PSA level he had an mri and was advised to have above  PATIENT CURRENTLY DENIES CHEST PAIN  SHORTNESS OF BREATH  PALPITATIONS,  DYSURIA, OR UPPER RESPIRATORY INFECTION IN PAST 2 WEEKS    Anesthesia Alert  NO--Difficult Airway  NO--History of neck surgery or radiation  NO--Limited ROM of neck  NO--History of Malignant hyperthermia  NO--Personal or family history of Pseudocholinesterase deficiency  NO--Prior Anesthesia Complication  NO--Latex Allergy  yes--Loose teeth ( upper denture bottom partial denture)  NO--History of Rheumatoid Arthritis  NO--CORINA  NO-- BLEEDING RISK  NO--Other_____        D[employer] Activity Status Index (DASI) from 20x200  on 2/26/2025      RESULT SUMMARY:  50.7 points  The higher the score (maximum 58.2), the higher the functional status.    8.97 METs        INPUTS:  Take care of self —> 2.75 = Yes  Walk indoors —> 1.75 = Yes  Walk 1&ndash;2 blocks on level ground —> 2.75 = Yes  Climb a flight of stairs or walk up a hill —> 5.5 = Yes  Run a short distance —> 8 = Yes  Do light work around the house —> 2.7 = Yes  Do moderate work around the house —> 3.5 = Yes  Do heavy work around the house —> 8 = Yes  Do yardwork —> 4.5 = Yes  Have sexual relations —> 5.25 = Yes  Participate in moderate recreational activities —> 6 = Yes  Participate in strenuous sports —> 0 = No        Revised Cardiac Risk Index for Pre-Operative Risk from 20x200  on 2/26/2025      RESULT SUMMARY:  0 points  RCRI Score    3.9 %  Risk of major cardiac event      INPUTS:  High-risk surgery —> 0 = No  History of ischemic heart disease —> 0 = No  History of congestive heart failure —> 0 = No  History of cerebrovascular disease —> 0 = No  Pre-operative treatment with insulin —> 0 = No  Pre-operative creatinine >2 mg/dL / 176.8 µmol/L —> 0 = No        As per patient, this is their complete medical and surgical history, including medications both prescribed or over the counter.  Patient verbalized understanding of instructions and was given the opportunity to ask questions and have them answered.

## 2025-02-27 DIAGNOSIS — R97.20 ELEVATED PROSTATE SPECIFIC ANTIGEN [PSA]: ICD-10-CM

## 2025-02-27 DIAGNOSIS — Z01.818 ENCOUNTER FOR OTHER PREPROCEDURAL EXAMINATION: ICD-10-CM

## 2025-02-27 LAB
CULTURE RESULTS: SIGNIFICANT CHANGE UP
SPECIMEN SOURCE: SIGNIFICANT CHANGE UP

## 2025-03-19 ENCOUNTER — RESULT REVIEW (OUTPATIENT)
Age: 75
End: 2025-03-19

## 2025-03-19 ENCOUNTER — APPOINTMENT (OUTPATIENT)
Dept: UROLOGY | Facility: HOSPITAL | Age: 75
End: 2025-03-19

## 2025-03-19 ENCOUNTER — OUTPATIENT (OUTPATIENT)
Dept: OUTPATIENT SERVICES | Facility: HOSPITAL | Age: 75
LOS: 1 days | Discharge: HOME | End: 2025-03-19
Payer: MEDICARE

## 2025-03-19 ENCOUNTER — TRANSCRIPTION ENCOUNTER (OUTPATIENT)
Age: 75
End: 2025-03-19

## 2025-03-19 VITALS
HEART RATE: 76 BPM | OXYGEN SATURATION: 97 % | WEIGHT: 209 LBS | HEIGHT: 71 IN | TEMPERATURE: 98 F | SYSTOLIC BLOOD PRESSURE: 132 MMHG | DIASTOLIC BLOOD PRESSURE: 76 MMHG | RESPIRATION RATE: 17 BRPM

## 2025-03-19 VITALS — SYSTOLIC BLOOD PRESSURE: 115 MMHG | DIASTOLIC BLOOD PRESSURE: 71 MMHG | RESPIRATION RATE: 17 BRPM | HEART RATE: 79 BPM

## 2025-03-19 DIAGNOSIS — Z98.890 OTHER SPECIFIED POSTPROCEDURAL STATES: Chronic | ICD-10-CM

## 2025-03-19 DIAGNOSIS — R97.20 ELEVATED PROSTATE SPECIFIC ANTIGEN [PSA]: ICD-10-CM

## 2025-03-19 PROCEDURE — 76942 ECHO GUIDE FOR BIOPSY: CPT | Mod: 26,59

## 2025-03-19 PROCEDURE — G0416: CPT | Mod: 26

## 2025-03-19 PROCEDURE — G0416: CPT

## 2025-03-19 PROCEDURE — 55700: CPT

## 2025-03-19 PROCEDURE — 76377 3D RENDER W/INTRP POSTPROCES: CPT | Mod: 26

## 2025-03-19 PROCEDURE — 76872 US TRANSRECTAL: CPT | Mod: 26

## 2025-03-19 RX ORDER — OPICAPONE 25 MG/1
1 CAPSULE ORAL
Refills: 0 | DISCHARGE

## 2025-03-19 RX ORDER — LINACLOTIDE 290 UG/1
1 CAPSULE, GELATIN COATED ORAL
Refills: 0 | DISCHARGE

## 2025-03-19 RX ORDER — RIVASTIGMINE 13.3 MG/24H
1 PATCH, EXTENDED RELEASE TRANSDERMAL
Refills: 0 | DISCHARGE

## 2025-03-19 RX ORDER — RASAGILINE 0.5 MG/1
1 TABLET ORAL
Refills: 0 | DISCHARGE

## 2025-03-19 RX ORDER — HYDROMORPHONE/SOD CHLOR,ISO/PF 2 MG/10 ML
0.5 SYRINGE (ML) INJECTION
Refills: 0 | Status: DISCONTINUED | OUTPATIENT
Start: 2025-03-19 | End: 2025-03-19

## 2025-03-19 RX ORDER — LOSARTAN POTASSIUM 100 MG/1
1 TABLET, FILM COATED ORAL
Refills: 0 | DISCHARGE

## 2025-03-19 RX ORDER — OXYCODONE HYDROCHLORIDE AND ACETAMINOPHEN 10; 325 MG/1; MG/1
1 TABLET ORAL ONCE
Refills: 0 | Status: DISCONTINUED | OUTPATIENT
Start: 2025-03-19 | End: 2025-03-19

## 2025-03-19 RX ORDER — FLUTICASONE FUROATE, UMECLIDINIUM BROMIDE AND VILANTEROL TRIFENATATE 100; 62.5; 25 UG/1; UG/1; UG/1
1 POWDER RESPIRATORY (INHALATION)
Refills: 0 | DISCHARGE

## 2025-03-19 RX ORDER — CARBIDOPA/LEVODOPA 25MG-100MG
1 TABLET ORAL
Refills: 0 | DISCHARGE

## 2025-03-19 RX ORDER — KETOROLAC TROMETHAMINE 30 MG/ML
30 INJECTION, SOLUTION INTRAMUSCULAR; INTRAVENOUS ONCE
Refills: 0 | Status: DISCONTINUED | OUTPATIENT
Start: 2025-03-19 | End: 2025-03-19

## 2025-03-19 RX ORDER — ONDANSETRON HCL/PF 4 MG/2 ML
4 VIAL (ML) INJECTION ONCE
Refills: 0 | Status: DISCONTINUED | OUTPATIENT
Start: 2025-03-19 | End: 2025-03-19

## 2025-03-19 NOTE — CHART NOTE - NSCHARTNOTEFT_GEN_A_CORE
PACU ANESTHESIA ADMISSION NOTE      Procedure: Prostate needle biopsy      Post op diagnosis:      ____  Intubated  TV:______       Rate: ______      FiO2: ______    _x___  Patent Airway    _x___  Full return of protective reflexes    _x___  Full recovery from anesthesia / back to baseline     Vitals:   T:98           R: 16                 BP: 146/76                 Sat97:                   P:77       Mental Status:  x____ Awake   ____x_ Alert   _____ Drowsy   _____ Sedated    Nausea/Vomiting:  ____ NO  ______Yes,   See Post - Op Orders          Pain Scale (0-10):  _____    Treatment: ____ None    ____ See Post - Op/PCA Orders    Post - Operative Fluids:   ____ Oral   ____ See Post - Op Orders    Plan: Discharge:   x____Home       _____Floor     _____Critical Care    _____  Other:_________________    Comments:

## 2025-03-19 NOTE — ASU PATIENT PROFILE, ADULT - NS SC CAGE ALCOHOL EYE OPENER
Patient Education     Arimidex  Generic Name: Anastrazole  Drug type  Arimidex reduces the amount of estrogen made in the body. It works to stop and get rid of the cancer.  What this drug is used for  Breast cancer or   How this drug is given  This is a tablet taken by mouth 1 time each day. You can take it with or without food. The size of your dose depends on many things. Your doctor will decide on the best dose for you.  Your dose:(date)  If you miss a dose, take it as soon as you think about it. If it is close to the time for your next dose, skip it and take the dose at your normal time. Do not take two doses at the same time. If you miss two doses, call the clinic to find out what to do.  Make sure your health care team knows about all the medicines and supplements you take. This will prevent drug interactions.  Side effects  Most people do not have all the side effects listed. Side effects will usually go away after the treatment is complete.  Common side effects  (occur in more than 3 out of 10 people):    Hot flashes, flushing.  Less common side effects  (occur in less than 3 out of 10 people):    Mood changes, feeling sad    Nausea (feel sick to your stomach) and vomiting (throwing up)    Tiredness, weakness    Pain in bones or joints.  Other information:  If you have any side effects, call us. We can help you manage these problems.  For more information, see:  www.chemocare.com  www.nlm.nih.gov/medlineplus/druginformation.htm  www.cancer.gov/cancertopics/coping/chemotherapy-and-you  For informational purposes only. Not to replace the advice of your health care provider.  Developed in collaboration with [facility].  Copyright   2013 [employer]. All rights reserved. [employer] 554813 - REV 02/16.  For informational purposes only. Not to replace the advice of your health care provider.  Copyright   2018 [employer]. All rights reserved.            no

## 2025-03-19 NOTE — PRE-ANESTHESIA EVALUATION ADULT - NSANTHOSAYNRD_GEN_A_CORE
denies/No. CORINA screening performed.  STOP BANG Legend: 0-2 = LOW Risk; 3-4 = INTERMEDIATE Risk; 5-8 = HIGH Risk

## 2025-03-19 NOTE — ASU DISCHARGE PLAN (ADULT/PEDIATRIC) - NS MD DC FALL RISK RISK
For information on Fall & Injury Prevention, visit: [email or web address] OR  [email or web address] OR  https://www.cdc.gov/steadi/patient.html

## 2025-03-19 NOTE — ASU PATIENT PROFILE, ADULT - FALL HARM RISK - HARM RISK INTERVENTIONS

## 2025-03-19 NOTE — ASU DISCHARGE PLAN (ADULT/PEDIATRIC) - FINANCIAL ASSISTANCE
[facility] provides services at a reduced cost to those who are determined to be eligible through [facility]’s financial assistance program. Information regarding [facility]’s financial assistance program can be found by going to [email or web address] or by calling [phone].

## 2025-03-20 ENCOUNTER — NON-APPOINTMENT (OUTPATIENT)
Age: 75
End: 2025-03-20

## 2025-03-20 PROBLEM — N42.9 DISORDER OF PROSTATE, UNSPECIFIED: Chronic | Status: ACTIVE | Noted: 2025-02-26

## 2025-03-21 LAB — SURGICAL PATHOLOGY STUDY: SIGNIFICANT CHANGE UP

## 2025-03-24 ENCOUNTER — APPOINTMENT (OUTPATIENT)
Dept: UROLOGY | Facility: CLINIC | Age: 75
End: 2025-03-24
Payer: MEDICARE

## 2025-03-24 VITALS
DIASTOLIC BLOOD PRESSURE: 78 MMHG | BODY MASS INDEX: 29.54 KG/M2 | HEART RATE: 88 BPM | RESPIRATION RATE: 16 BRPM | OXYGEN SATURATION: 98 % | SYSTOLIC BLOOD PRESSURE: 121 MMHG | HEIGHT: 71 IN | TEMPERATURE: 97.3 F | WEIGHT: 211 LBS

## 2025-03-24 DIAGNOSIS — G20.A1 PARKINSON'S DISEASE WITHOUT DYSKINESIA, WITHOUT MENTION OF FLUCTUATIONS: ICD-10-CM

## 2025-03-24 DIAGNOSIS — R97.20 ELEVATED PROSTATE, SPECIFIC ANTIGEN [PSA]: ICD-10-CM

## 2025-03-24 DIAGNOSIS — M48.061 SPINAL STENOSIS, LUMBAR REGION WITHOUT NEUROGENIC CLAUDICATION: ICD-10-CM

## 2025-03-24 DIAGNOSIS — N52.9 MALE ERECTILE DYSFUNCTION, UNSPECIFIED: ICD-10-CM

## 2025-03-24 DIAGNOSIS — C61 MALIGNANT NEOPLASM OF PROSTATE: ICD-10-CM

## 2025-03-24 PROBLEM — Z86.69 PERSONAL HISTORY OF OTHER DISEASES OF THE NERVOUS SYSTEM AND SENSE ORGANS: Chronic | Status: ACTIVE | Noted: 2025-02-26

## 2025-03-24 PROCEDURE — G2211 COMPLEX E/M VISIT ADD ON: CPT

## 2025-03-24 PROCEDURE — 99215 OFFICE O/P EST HI 40 MIN: CPT

## 2025-03-27 DIAGNOSIS — G20.A1 PARKINSON'S DISEASE WITHOUT DYSKINESIA, WITHOUT MENTION OF FLUCTUATIONS: ICD-10-CM

## 2025-03-27 DIAGNOSIS — C61 MALIGNANT NEOPLASM OF PROSTATE: ICD-10-CM

## 2025-03-27 DIAGNOSIS — Z79.85 LONG-TERM (CURRENT) USE OF INJECTABLE NON-INSULIN ANTIDIABETIC DRUGS: ICD-10-CM

## 2025-03-27 DIAGNOSIS — R97.20 ELEVATED PROSTATE SPECIFIC ANTIGEN [PSA]: ICD-10-CM

## 2025-03-27 DIAGNOSIS — Z87.891 PERSONAL HISTORY OF NICOTINE DEPENDENCE: ICD-10-CM

## 2025-07-08 ENCOUNTER — APPOINTMENT (OUTPATIENT)
Dept: SURGERY | Facility: CLINIC | Age: 75
End: 2025-07-08
Payer: MEDICARE

## 2025-07-08 VITALS — BODY MASS INDEX: 30.52 KG/M2 | WEIGHT: 218 LBS | HEIGHT: 71 IN

## 2025-07-08 PROCEDURE — 99203 OFFICE O/P NEW LOW 30 MIN: CPT

## 2025-07-28 ENCOUNTER — LABORATORY RESULT (OUTPATIENT)
Age: 75
End: 2025-07-28

## 2025-07-29 ENCOUNTER — OUTPATIENT (OUTPATIENT)
Dept: OUTPATIENT SERVICES | Facility: HOSPITAL | Age: 75
LOS: 1 days | End: 2025-07-29
Payer: MEDICARE

## 2025-07-29 VITALS
HEIGHT: 71 IN | TEMPERATURE: 98 F | SYSTOLIC BLOOD PRESSURE: 117 MMHG | DIASTOLIC BLOOD PRESSURE: 61 MMHG | RESPIRATION RATE: 16 BRPM | OXYGEN SATURATION: 97 % | HEART RATE: 85 BPM | WEIGHT: 212.97 LBS

## 2025-07-29 DIAGNOSIS — K43.6 OTHER AND UNSPECIFIED VENTRAL HERNIA WITH OBSTRUCTION, WITHOUT GANGRENE: ICD-10-CM

## 2025-07-29 DIAGNOSIS — Z01.818 ENCOUNTER FOR OTHER PREPROCEDURAL EXAMINATION: ICD-10-CM

## 2025-07-29 DIAGNOSIS — Z98.890 OTHER SPECIFIED POSTPROCEDURAL STATES: Chronic | ICD-10-CM

## 2025-07-29 LAB
ALBUMIN SERPL ELPH-MCNC: 4.5 G/DL — SIGNIFICANT CHANGE UP (ref 3.5–5.2)
ALP SERPL-CCNC: 83 U/L — SIGNIFICANT CHANGE UP (ref 30–115)
ALT FLD-CCNC: <5 U/L — SIGNIFICANT CHANGE UP (ref 0–41)
ANION GAP SERPL CALC-SCNC: 12 MMOL/L — SIGNIFICANT CHANGE UP (ref 7–14)
APPEARANCE UR: CLEAR — SIGNIFICANT CHANGE UP
AST SERPL-CCNC: 17 U/L — SIGNIFICANT CHANGE UP (ref 0–41)
BACTERIA # UR AUTO: NEGATIVE /HPF — SIGNIFICANT CHANGE UP
BASOPHILS # BLD AUTO: 0.04 K/UL — SIGNIFICANT CHANGE UP (ref 0–0.2)
BASOPHILS NFR BLD AUTO: 0.7 % — SIGNIFICANT CHANGE UP (ref 0–1)
BILIRUB SERPL-MCNC: 2 MG/DL — HIGH (ref 0.2–1.2)
BILIRUB UR-MCNC: NEGATIVE — SIGNIFICANT CHANGE UP
BUN SERPL-MCNC: 17 MG/DL — SIGNIFICANT CHANGE UP (ref 10–20)
CALCIUM SERPL-MCNC: 9.3 MG/DL — SIGNIFICANT CHANGE UP (ref 8.4–10.5)
CAST: 4 /LPF — SIGNIFICANT CHANGE UP (ref 0–4)
CHLORIDE SERPL-SCNC: 103 MMOL/L — SIGNIFICANT CHANGE UP (ref 98–110)
CO2 SERPL-SCNC: 23 MMOL/L — SIGNIFICANT CHANGE UP (ref 17–32)
COLOR SPEC: SIGNIFICANT CHANGE UP
CREAT SERPL-MCNC: 0.7 MG/DL — SIGNIFICANT CHANGE UP (ref 0.7–1.5)
DIFF PNL FLD: NEGATIVE — SIGNIFICANT CHANGE UP
EGFR: 97 ML/MIN/1.73M2 — SIGNIFICANT CHANGE UP
EGFR: 97 ML/MIN/1.73M2 — SIGNIFICANT CHANGE UP
EOSINOPHIL # BLD AUTO: 0.16 K/UL — SIGNIFICANT CHANGE UP (ref 0–0.7)
EOSINOPHIL NFR BLD AUTO: 2.7 % — SIGNIFICANT CHANGE UP (ref 0–8)
GLUCOSE SERPL-MCNC: 116 MG/DL — HIGH (ref 70–99)
GLUCOSE UR QL: NEGATIVE MG/DL — SIGNIFICANT CHANGE UP
HCT VFR BLD CALC: 42.5 % — SIGNIFICANT CHANGE UP (ref 42–52)
HGB BLD-MCNC: 14.6 G/DL — SIGNIFICANT CHANGE UP (ref 14–18)
IMM GRANULOCYTES NFR BLD AUTO: 0.5 % — HIGH (ref 0.1–0.3)
KETONES UR QL: 15 MG/DL
LEUKOCYTE ESTERASE UR-ACNC: ABNORMAL
LYMPHOCYTES # BLD AUTO: 1.38 K/UL — SIGNIFICANT CHANGE UP (ref 1.2–3.4)
LYMPHOCYTES # BLD AUTO: 22.9 % — SIGNIFICANT CHANGE UP (ref 20.5–51.1)
MCHC RBC-ENTMCNC: 29.7 PG — SIGNIFICANT CHANGE UP (ref 27–31)
MCHC RBC-ENTMCNC: 34.4 G/DL — SIGNIFICANT CHANGE UP (ref 32–37)
MCV RBC AUTO: 86.4 FL — SIGNIFICANT CHANGE UP (ref 80–94)
MONOCYTES # BLD AUTO: 0.63 K/UL — HIGH (ref 0.1–0.6)
MONOCYTES NFR BLD AUTO: 10.4 % — HIGH (ref 1.7–9.3)
NEUTROPHILS # BLD AUTO: 3.79 K/UL — SIGNIFICANT CHANGE UP (ref 1.4–6.5)
NEUTROPHILS NFR BLD AUTO: 62.8 % — SIGNIFICANT CHANGE UP (ref 42.2–75.2)
NITRITE UR-MCNC: NEGATIVE — SIGNIFICANT CHANGE UP
NRBC BLD AUTO-RTO: 0 /100 WBCS — SIGNIFICANT CHANGE UP (ref 0–0)
PH UR: 5.5 — SIGNIFICANT CHANGE UP (ref 5–8)
PLATELET # BLD AUTO: 171 K/UL — SIGNIFICANT CHANGE UP (ref 130–400)
PMV BLD: 10.7 FL — HIGH (ref 7.4–10.4)
POTASSIUM SERPL-MCNC: 3.9 MMOL/L — SIGNIFICANT CHANGE UP (ref 3.5–5)
POTASSIUM SERPL-SCNC: 3.9 MMOL/L — SIGNIFICANT CHANGE UP (ref 3.5–5)
PROT SERPL-MCNC: 6.1 G/DL — SIGNIFICANT CHANGE UP (ref 6–8)
PROT UR-MCNC: 30 MG/DL
RBC # BLD: 4.92 M/UL — SIGNIFICANT CHANGE UP (ref 4.7–6.1)
RBC # FLD: 12.9 % — SIGNIFICANT CHANGE UP (ref 11.5–14.5)
RBC CASTS # UR COMP ASSIST: 5 /HPF — HIGH (ref 0–4)
SODIUM SERPL-SCNC: 138 MMOL/L — SIGNIFICANT CHANGE UP (ref 135–146)
SP GR SPEC: >1.03 — HIGH (ref 1–1.03)
SQUAMOUS # UR AUTO: 2 /HPF — SIGNIFICANT CHANGE UP (ref 0–5)
UROBILINOGEN FLD QL: 1 MG/DL — SIGNIFICANT CHANGE UP (ref 0.2–1)
WBC # BLD: 6.03 K/UL — SIGNIFICANT CHANGE UP (ref 4.8–10.8)
WBC # FLD AUTO: 6.03 K/UL — SIGNIFICANT CHANGE UP (ref 4.8–10.8)
WBC UR QL: 2 /HPF — SIGNIFICANT CHANGE UP (ref 0–5)

## 2025-07-29 PROCEDURE — 81001 URINALYSIS AUTO W/SCOPE: CPT

## 2025-07-29 PROCEDURE — 80053 COMPREHEN METABOLIC PANEL: CPT

## 2025-07-29 PROCEDURE — 93010 ELECTROCARDIOGRAM REPORT: CPT

## 2025-07-29 PROCEDURE — 36415 COLL VENOUS BLD VENIPUNCTURE: CPT

## 2025-07-29 PROCEDURE — 99214 OFFICE O/P EST MOD 30 MIN: CPT | Mod: 25

## 2025-07-29 PROCEDURE — 87086 URINE CULTURE/COLONY COUNT: CPT

## 2025-07-29 PROCEDURE — 93005 ELECTROCARDIOGRAM TRACING: CPT

## 2025-07-29 PROCEDURE — 85025 COMPLETE CBC W/AUTO DIFF WBC: CPT

## 2025-07-30 DIAGNOSIS — K43.6 OTHER AND UNSPECIFIED VENTRAL HERNIA WITH OBSTRUCTION, WITHOUT GANGRENE: ICD-10-CM

## 2025-07-30 DIAGNOSIS — Z01.818 ENCOUNTER FOR OTHER PREPROCEDURAL EXAMINATION: ICD-10-CM

## 2025-07-30 LAB
CULTURE RESULTS: SIGNIFICANT CHANGE UP
SPECIMEN SOURCE: SIGNIFICANT CHANGE UP

## 2025-08-01 ENCOUNTER — APPOINTMENT (OUTPATIENT)
Dept: UROLOGY | Facility: CLINIC | Age: 75
End: 2025-08-01
Payer: MEDICARE

## 2025-08-01 VITALS
HEART RATE: 91 BPM | HEIGHT: 71 IN | DIASTOLIC BLOOD PRESSURE: 68 MMHG | OXYGEN SATURATION: 95 % | SYSTOLIC BLOOD PRESSURE: 127 MMHG | WEIGHT: 218 LBS | BODY MASS INDEX: 30.52 KG/M2

## 2025-08-01 DIAGNOSIS — N40.1 BENIGN PROSTATIC HYPERPLASIA WITH LOWER URINARY TRACT SYMPMS: ICD-10-CM

## 2025-08-01 DIAGNOSIS — N13.8 BENIGN PROSTATIC HYPERPLASIA WITH LOWER URINARY TRACT SYMPMS: ICD-10-CM

## 2025-08-01 DIAGNOSIS — R97.20 ELEVATED PROSTATE, SPECIFIC ANTIGEN [PSA]: ICD-10-CM

## 2025-08-01 DIAGNOSIS — N52.9 MALE ERECTILE DYSFUNCTION, UNSPECIFIED: ICD-10-CM

## 2025-08-01 DIAGNOSIS — C61 MALIGNANT NEOPLASM OF PROSTATE: ICD-10-CM

## 2025-08-01 PROCEDURE — 81003 URINALYSIS AUTO W/O SCOPE: CPT | Mod: QW

## 2025-08-01 PROCEDURE — 99214 OFFICE O/P EST MOD 30 MIN: CPT | Mod: 25

## 2025-08-01 RX ORDER — TAMSULOSIN HYDROCHLORIDE 0.4 MG/1
0.4 CAPSULE ORAL
Qty: 30 | Refills: 5 | Status: ACTIVE | COMMUNITY
Start: 2025-08-01 | End: 1900-01-01

## 2025-08-03 LAB
BILIRUB UR QL STRIP: NORMAL
COLLECTION METHOD: NORMAL
GLUCOSE UR-MCNC: NORMAL
HCG UR QL: 0.2 EU/DL
HGB UR QL STRIP.AUTO: NORMAL
KETONES UR-MCNC: NORMAL
LEUKOCYTE ESTERASE UR QL STRIP: NORMAL
NITRITE UR QL STRIP: NORMAL
PH UR STRIP: 6
PROT UR STRIP-MCNC: 30
SP GR UR STRIP: 1.03

## 2025-08-04 ENCOUNTER — NON-APPOINTMENT (OUTPATIENT)
Age: 75
End: 2025-08-04

## 2025-08-06 ENCOUNTER — TRANSCRIPTION ENCOUNTER (OUTPATIENT)
Age: 75
End: 2025-08-06

## 2025-08-06 ENCOUNTER — APPOINTMENT (OUTPATIENT)
Dept: SURGERY | Facility: AMBULATORY SURGERY CENTER | Age: 75
End: 2025-08-06
Payer: MEDICARE

## 2025-08-06 ENCOUNTER — OUTPATIENT (OUTPATIENT)
Dept: OUTPATIENT SERVICES | Facility: HOSPITAL | Age: 75
LOS: 1 days | Discharge: HOME | End: 2025-08-06
Payer: MEDICARE

## 2025-08-06 VITALS
DIASTOLIC BLOOD PRESSURE: 75 MMHG | RESPIRATION RATE: 18 BRPM | HEIGHT: 71 IN | TEMPERATURE: 98 F | SYSTOLIC BLOOD PRESSURE: 127 MMHG | HEART RATE: 77 BPM | OXYGEN SATURATION: 95 % | WEIGHT: 212.97 LBS

## 2025-08-06 VITALS
OXYGEN SATURATION: 96 % | HEART RATE: 76 BPM | DIASTOLIC BLOOD PRESSURE: 72 MMHG | SYSTOLIC BLOOD PRESSURE: 136 MMHG | RESPIRATION RATE: 20 BRPM

## 2025-08-06 DIAGNOSIS — K43.6 OTHER AND UNSPECIFIED VENTRAL HERNIA WITH OBSTRUCTION, WITHOUT GANGRENE: ICD-10-CM

## 2025-08-06 DIAGNOSIS — Z98.890 OTHER SPECIFIED POSTPROCEDURAL STATES: Chronic | ICD-10-CM

## 2025-08-06 PROCEDURE — C1781: CPT

## 2025-08-06 PROCEDURE — 49594 RPR AA HRN 1ST 3-10 NCR/STRN: CPT

## 2025-08-06 RX ORDER — SODIUM CHLORIDE 9 G/1000ML
1000 INJECTION, SOLUTION INTRAVENOUS
Refills: 0 | Status: DISCONTINUED | OUTPATIENT
Start: 2025-08-06 | End: 2025-08-06

## 2025-08-06 RX ORDER — TADALAFIL 20 MG/1
1 TABLET, FILM COATED ORAL
Refills: 0 | DISCHARGE

## 2025-08-06 RX ORDER — ONDANSETRON HCL/PF 4 MG/2 ML
4 VIAL (ML) INJECTION ONCE
Refills: 0 | Status: DISCONTINUED | OUTPATIENT
Start: 2025-08-06 | End: 2025-08-06

## 2025-08-06 RX ORDER — CARBIDOPA/LEVODOPA 25MG-100MG
2 TABLET ORAL
Refills: 0 | DISCHARGE

## 2025-08-06 RX ORDER — HYDROMORPHONE/SOD CHLOR,ISO/PF 2 MG/10 ML
0.5 SYRINGE (ML) INJECTION
Refills: 0 | Status: DISCONTINUED | OUTPATIENT
Start: 2025-08-06 | End: 2025-08-06

## 2025-08-06 RX ORDER — ATORVASTATIN CALCIUM 80 MG/1
1 TABLET, FILM COATED ORAL
Refills: 0 | DISCHARGE

## 2025-08-06 RX ORDER — TERBINAFINE HYDROCHLORIDE 250 MG/1
1 TABLET ORAL
Refills: 0 | DISCHARGE

## 2025-08-06 RX ORDER — ASPIRIN 325 MG
1 TABLET ORAL
Refills: 0 | DISCHARGE

## 2025-08-06 RX ORDER — LOSARTAN POTASSIUM 100 MG/1
1 TABLET, FILM COATED ORAL
Refills: 0 | DISCHARGE

## 2025-08-06 RX ORDER — ACETAMINOPHEN 500 MG/5ML
650 LIQUID (ML) ORAL ONCE
Refills: 0 | Status: DISCONTINUED | OUTPATIENT
Start: 2025-08-06 | End: 2025-08-06

## 2025-08-11 DIAGNOSIS — K42.0 UMBILICAL HERNIA WITH OBSTRUCTION, WITHOUT GANGRENE: ICD-10-CM

## 2025-08-11 DIAGNOSIS — Z79.82 LONG TERM (CURRENT) USE OF ASPIRIN: ICD-10-CM

## 2025-08-11 DIAGNOSIS — Z87.891 PERSONAL HISTORY OF NICOTINE DEPENDENCE: ICD-10-CM

## 2025-08-11 DIAGNOSIS — G20.A1 PARKINSON'S DISEASE WITHOUT DYSKINESIA, WITHOUT MENTION OF FLUCTUATIONS: ICD-10-CM

## 2025-08-13 ENCOUNTER — APPOINTMENT (OUTPATIENT)
Dept: SURGERY | Facility: CLINIC | Age: 75
End: 2025-08-13
Payer: MEDICARE

## 2025-08-13 DIAGNOSIS — K43.6 OTHER AND UNSPECIFIED VENTRAL HERNIA WITH OBSTRUCTION, W/OUT GANGRENE: ICD-10-CM

## 2025-08-13 PROCEDURE — 99213 OFFICE O/P EST LOW 20 MIN: CPT

## 2025-08-13 RX ORDER — TRAMADOL HYDROCHLORIDE 50 MG/1
50 TABLET, COATED ORAL
Qty: 12 | Refills: 0 | Status: COMPLETED | COMMUNITY
Start: 2025-08-04 | End: 2025-08-13

## 2025-09-08 ENCOUNTER — TRANSCRIPTION ENCOUNTER (OUTPATIENT)
Age: 75
End: 2025-09-08

## 2025-09-09 ENCOUNTER — TRANSCRIPTION ENCOUNTER (OUTPATIENT)
Age: 75
End: 2025-09-09